# Patient Record
Sex: MALE | Race: WHITE | NOT HISPANIC OR LATINO | Employment: OTHER | URBAN - METROPOLITAN AREA
[De-identification: names, ages, dates, MRNs, and addresses within clinical notes are randomized per-mention and may not be internally consistent; named-entity substitution may affect disease eponyms.]

---

## 2023-02-25 PROBLEM — R09.89 CAROTID BRUIT: Status: ACTIVE | Noted: 2023-02-25

## 2023-02-25 PROBLEM — I25.10 ARTERIOSCLEROSIS OF CORONARY ARTERY: Status: ACTIVE | Noted: 2023-02-25

## 2023-02-25 PROBLEM — E78.5 HYPERLIPIDEMIA: Status: ACTIVE | Noted: 2023-02-25

## 2023-02-25 PROBLEM — G47.33 OSA (OBSTRUCTIVE SLEEP APNEA): Status: ACTIVE | Noted: 2023-02-25

## 2023-02-25 PROBLEM — F41.9 ANXIETY AND DEPRESSION: Status: ACTIVE | Noted: 2023-02-25

## 2023-02-25 PROBLEM — N40.0 BPH (BENIGN PROSTATIC HYPERPLASIA): Status: ACTIVE | Noted: 2023-02-25

## 2023-02-25 PROBLEM — R73.9 BORDERLINE HYPERGLYCEMIA: Status: ACTIVE | Noted: 2023-02-25

## 2023-02-25 PROBLEM — E53.8 VITAMIN B12 DEFICIENCY: Status: ACTIVE | Noted: 2023-02-25

## 2023-02-25 PROBLEM — E78.5 DYSLIPIDEMIA: Status: ACTIVE | Noted: 2023-02-25

## 2023-02-25 PROBLEM — Z98.61 S/P PTCA (PERCUTANEOUS TRANSLUMINAL CORONARY ANGIOPLASTY): Status: ACTIVE | Noted: 2023-02-25

## 2023-02-25 PROBLEM — E78.01 ESSENTIAL FAMILIAL HYPERCHOLESTEROLEMIA: Status: ACTIVE | Noted: 2023-02-25

## 2023-02-25 PROBLEM — F32.A ANXIETY AND DEPRESSION: Status: ACTIVE | Noted: 2023-02-25

## 2023-02-25 PROBLEM — R35.0 INCREASED FREQUENCY OF URINATION: Status: ACTIVE | Noted: 2023-02-25

## 2023-02-25 PROBLEM — J44.89 CHRONIC OBSTRUCTIVE ASTHMA (MULTI): Status: ACTIVE | Noted: 2023-02-25

## 2023-02-25 RX ORDER — NITROGLYCERIN 0.4 MG/1
0.4 TABLET SUBLINGUAL EVERY 5 MIN PRN
COMMUNITY
Start: 2017-05-15

## 2023-02-25 RX ORDER — METOPROLOL SUCCINATE 25 MG/1
1 TABLET, EXTENDED RELEASE ORAL DAILY
COMMUNITY
Start: 2017-05-15 | End: 2023-07-17

## 2023-02-25 RX ORDER — ASPIRIN 81 MG/1
81 TABLET ORAL NIGHTLY
COMMUNITY
Start: 2022-04-06 | End: 2023-11-13 | Stop reason: HOSPADM

## 2023-02-25 RX ORDER — EVOLOCUMAB 140 MG/ML
140 INJECTION, SOLUTION SUBCUTANEOUS
COMMUNITY
Start: 2022-05-16 | End: 2023-11-07 | Stop reason: SDUPTHER

## 2023-02-25 RX ORDER — FLUTICASONE FUROATE AND VILANTEROL 200; 25 UG/1; UG/1
1 POWDER RESPIRATORY (INHALATION) DAILY
COMMUNITY
Start: 2016-11-23 | End: 2023-11-07 | Stop reason: ALTCHOICE

## 2023-02-25 RX ORDER — FESOTERODINE FUMARATE 8 MG/1
1 TABLET, FILM COATED, EXTENDED RELEASE ORAL DAILY
COMMUNITY

## 2023-02-25 RX ORDER — EZETIMIBE 10 MG/1
1 TABLET ORAL DAILY
COMMUNITY
Start: 2022-04-06 | End: 2023-04-04 | Stop reason: SINTOL

## 2023-02-25 RX ORDER — MAGNESIUM 200 MG
1 TABLET ORAL DAILY
COMMUNITY
Start: 2020-09-10 | End: 2024-04-09 | Stop reason: ALTCHOICE

## 2023-04-04 ENCOUNTER — LAB (OUTPATIENT)
Dept: LAB | Facility: LAB | Age: 68
End: 2023-04-04
Payer: MEDICARE

## 2023-04-04 ENCOUNTER — OFFICE VISIT (OUTPATIENT)
Dept: PRIMARY CARE | Facility: CLINIC | Age: 68
End: 2023-04-04
Payer: MEDICARE

## 2023-04-04 VITALS
BODY MASS INDEX: 33.14 KG/M2 | TEMPERATURE: 97.6 F | HEART RATE: 57 BPM | HEIGHT: 66 IN | OXYGEN SATURATION: 93 % | SYSTOLIC BLOOD PRESSURE: 110 MMHG | WEIGHT: 206.2 LBS | DIASTOLIC BLOOD PRESSURE: 70 MMHG

## 2023-04-04 DIAGNOSIS — J44.89 CHRONIC OBSTRUCTIVE ASTHMA (MULTI): ICD-10-CM

## 2023-04-04 DIAGNOSIS — M79.10 MYALGIA: ICD-10-CM

## 2023-04-04 DIAGNOSIS — Z12.11 SCREEN FOR COLON CANCER: ICD-10-CM

## 2023-04-04 DIAGNOSIS — R79.9 ABNORMAL FINDING OF BLOOD CHEMISTRY, UNSPECIFIED: ICD-10-CM

## 2023-04-04 DIAGNOSIS — I10 HYPERTENSION, UNSPECIFIED TYPE: ICD-10-CM

## 2023-04-04 DIAGNOSIS — Z00.00 ROUTINE GENERAL MEDICAL EXAMINATION AT HEALTH CARE FACILITY: Primary | ICD-10-CM

## 2023-04-04 DIAGNOSIS — N40.0 BENIGN PROSTATIC HYPERPLASIA, UNSPECIFIED WHETHER LOWER URINARY TRACT SYMPTOMS PRESENT: ICD-10-CM

## 2023-04-04 DIAGNOSIS — G47.33 OSA (OBSTRUCTIVE SLEEP APNEA): ICD-10-CM

## 2023-04-04 DIAGNOSIS — M89.9 DISORDER OF BONE, UNSPECIFIED: ICD-10-CM

## 2023-04-04 DIAGNOSIS — E53.8 VITAMIN B12 DEFICIENCY: ICD-10-CM

## 2023-04-04 DIAGNOSIS — G62.9 NEUROPATHY: ICD-10-CM

## 2023-04-04 DIAGNOSIS — Z98.61 S/P PTCA (PERCUTANEOUS TRANSLUMINAL CORONARY ANGIOPLASTY): ICD-10-CM

## 2023-04-04 DIAGNOSIS — M19.90 ARTHRITIS: ICD-10-CM

## 2023-04-04 DIAGNOSIS — I25.10 ARTERIOSCLEROSIS OF CORONARY ARTERY: ICD-10-CM

## 2023-04-04 DIAGNOSIS — Z13.31 SCREENING FOR DEPRESSION: ICD-10-CM

## 2023-04-04 LAB
ALANINE AMINOTRANSFERASE (SGPT) (U/L) IN SER/PLAS: 45 U/L (ref 10–52)
ALBUMIN (G/DL) IN SER/PLAS: 4.2 G/DL (ref 3.4–5)
ALKALINE PHOSPHATASE (U/L) IN SER/PLAS: 96 U/L (ref 33–136)
ANION GAP IN SER/PLAS: 11 MMOL/L (ref 10–20)
ASPARTATE AMINOTRANSFERASE (SGOT) (U/L) IN SER/PLAS: 29 U/L (ref 9–39)
BASOPHILS (10*3/UL) IN BLOOD BY AUTOMATED COUNT: 0.04 X10E9/L (ref 0–0.1)
BASOPHILS/100 LEUKOCYTES IN BLOOD BY AUTOMATED COUNT: 0.7 % (ref 0–2)
BILIRUBIN TOTAL (MG/DL) IN SER/PLAS: 0.8 MG/DL (ref 0–1.2)
CALCIUM (MG/DL) IN SER/PLAS: 8.9 MG/DL (ref 8.6–10.3)
CARBON DIOXIDE, TOTAL (MMOL/L) IN SER/PLAS: 27 MMOL/L (ref 21–32)
CHLORIDE (MMOL/L) IN SER/PLAS: 106 MMOL/L (ref 98–107)
CREATINE KINASE (U/L) IN SER/PLAS: 98 U/L (ref 0–325)
CREATININE (MG/DL) IN SER/PLAS: 0.74 MG/DL (ref 0.5–1.3)
EOSINOPHILS (10*3/UL) IN BLOOD BY AUTOMATED COUNT: 0.2 X10E9/L (ref 0–0.7)
EOSINOPHILS/100 LEUKOCYTES IN BLOOD BY AUTOMATED COUNT: 3.3 % (ref 0–6)
ERYTHROCYTE DISTRIBUTION WIDTH (RATIO) BY AUTOMATED COUNT: 12.3 % (ref 11.5–14.5)
ERYTHROCYTE MEAN CORPUSCULAR HEMOGLOBIN CONCENTRATION (G/DL) BY AUTOMATED: 33.8 G/DL (ref 32–36)
ERYTHROCYTE MEAN CORPUSCULAR VOLUME (FL) BY AUTOMATED COUNT: 88 FL (ref 80–100)
ERYTHROCYTES (10*6/UL) IN BLOOD BY AUTOMATED COUNT: 5.14 X10E12/L (ref 4.5–5.9)
GFR MALE: >90 ML/MIN/1.73M2
GLUCOSE (MG/DL) IN SER/PLAS: 89 MG/DL (ref 74–99)
HEMATOCRIT (%) IN BLOOD BY AUTOMATED COUNT: 45.3 % (ref 41–52)
HEMOGLOBIN (G/DL) IN BLOOD: 15.3 G/DL (ref 13.5–17.5)
IMMATURE GRANULOCYTES/100 LEUKOCYTES IN BLOOD BY AUTOMATED COUNT: 0.3 % (ref 0–0.9)
LEUKOCYTES (10*3/UL) IN BLOOD BY AUTOMATED COUNT: 6.1 X10E9/L (ref 4.4–11.3)
LYMPHOCYTES (10*3/UL) IN BLOOD BY AUTOMATED COUNT: 2.15 X10E9/L (ref 1.2–4.8)
LYMPHOCYTES/100 LEUKOCYTES IN BLOOD BY AUTOMATED COUNT: 35.2 % (ref 13–44)
MAGNESIUM (MG/DL) IN SER/PLAS: 2.13 MG/DL (ref 1.6–2.4)
MONOCYTES (10*3/UL) IN BLOOD BY AUTOMATED COUNT: 0.49 X10E9/L (ref 0.1–1)
MONOCYTES/100 LEUKOCYTES IN BLOOD BY AUTOMATED COUNT: 8 % (ref 2–10)
NEUTROPHILS (10*3/UL) IN BLOOD BY AUTOMATED COUNT: 3.2 X10E9/L (ref 1.2–7.7)
NEUTROPHILS/100 LEUKOCYTES IN BLOOD BY AUTOMATED COUNT: 52.5 % (ref 40–80)
PLATELETS (10*3/UL) IN BLOOD AUTOMATED COUNT: 221 X10E9/L (ref 150–450)
POTASSIUM (MMOL/L) IN SER/PLAS: 4.3 MMOL/L (ref 3.5–5.3)
PROTEIN TOTAL: 6.5 G/DL (ref 6.4–8.2)
SODIUM (MMOL/L) IN SER/PLAS: 140 MMOL/L (ref 136–145)
URATE (MG/DL) IN SER/PLAS: 5.6 MG/DL (ref 4–7.5)
UREA NITROGEN (MG/DL) IN SER/PLAS: 14 MG/DL (ref 6–23)

## 2023-04-04 PROCEDURE — G0439 PPPS, SUBSEQ VISIT: HCPCS | Performed by: INTERNAL MEDICINE

## 2023-04-04 PROCEDURE — 86039 ANTINUCLEAR ANTIBODIES (ANA): CPT

## 2023-04-04 PROCEDURE — 3078F DIAST BP <80 MM HG: CPT | Performed by: INTERNAL MEDICINE

## 2023-04-04 PROCEDURE — 36415 COLL VENOUS BLD VENIPUNCTURE: CPT

## 2023-04-04 PROCEDURE — 1170F FXNL STATUS ASSESSED: CPT | Performed by: INTERNAL MEDICINE

## 2023-04-04 PROCEDURE — 1160F RVW MEDS BY RX/DR IN RCRD: CPT | Performed by: INTERNAL MEDICINE

## 2023-04-04 PROCEDURE — 87476 LYME DIS DNA AMP PROBE: CPT

## 2023-04-04 PROCEDURE — 84550 ASSAY OF BLOOD/URIC ACID: CPT

## 2023-04-04 PROCEDURE — 83036 HEMOGLOBIN GLYCOSYLATED A1C: CPT

## 2023-04-04 PROCEDURE — G0444 DEPRESSION SCREEN ANNUAL: HCPCS | Performed by: INTERNAL MEDICINE

## 2023-04-04 PROCEDURE — 1036F TOBACCO NON-USER: CPT | Performed by: INTERNAL MEDICINE

## 2023-04-04 PROCEDURE — 85025 COMPLETE CBC W/AUTO DIFF WBC: CPT

## 2023-04-04 PROCEDURE — 82306 VITAMIN D 25 HYDROXY: CPT

## 2023-04-04 PROCEDURE — 83735 ASSAY OF MAGNESIUM: CPT

## 2023-04-04 PROCEDURE — 80053 COMPREHEN METABOLIC PANEL: CPT

## 2023-04-04 PROCEDURE — 99214 OFFICE O/P EST MOD 30 MIN: CPT | Performed by: INTERNAL MEDICINE

## 2023-04-04 PROCEDURE — 3074F SYST BP LT 130 MM HG: CPT | Performed by: INTERNAL MEDICINE

## 2023-04-04 PROCEDURE — 86038 ANTINUCLEAR ANTIBODIES: CPT

## 2023-04-04 PROCEDURE — 82550 ASSAY OF CK (CPK): CPT

## 2023-04-04 PROCEDURE — 1159F MED LIST DOCD IN RCRD: CPT | Performed by: INTERNAL MEDICINE

## 2023-04-04 PROCEDURE — 86225 DNA ANTIBODY NATIVE: CPT

## 2023-04-04 PROCEDURE — 86235 NUCLEAR ANTIGEN ANTIBODY: CPT

## 2023-04-04 ASSESSMENT — ACTIVITIES OF DAILY LIVING (ADL)
TAKING_MEDICATION: INDEPENDENT
GROCERY_SHOPPING: INDEPENDENT
BATHING: INDEPENDENT
DRESSING: INDEPENDENT
MANAGING_FINANCES: INDEPENDENT
DOING_HOUSEWORK: INDEPENDENT

## 2023-04-04 ASSESSMENT — ENCOUNTER SYMPTOMS
DEPRESSION: 0
BLOOD IN STOOL: 0
LOSS OF SENSATION IN FEET: 0
DIFFICULTY URINATING: 0
HEADACHES: 0
SORE THROAT: 0
DIARRHEA: 0
ARTHRALGIAS: 1
PALPITATIONS: 0
FEVER: 0
WHEEZING: 0
ABDOMINAL PAIN: 0
SINUS PAIN: 0
UNEXPECTED WEIGHT CHANGE: 0
WEAKNESS: 1
DIZZINESS: 0
COUGH: 0
NUMBNESS: 1
OCCASIONAL FEELINGS OF UNSTEADINESS: 0
BRUISES/BLEEDS EASILY: 0
FATIGUE: 0

## 2023-04-04 ASSESSMENT — PATIENT HEALTH QUESTIONNAIRE - PHQ9
1. LITTLE INTEREST OR PLEASURE IN DOING THINGS: NOT AT ALL
2. FEELING DOWN, DEPRESSED OR HOPELESS: NOT AT ALL
SUM OF ALL RESPONSES TO PHQ9 QUESTIONS 1 AND 2: 0

## 2023-04-04 NOTE — PROGRESS NOTES
"Subjective   Reason for Visit: Talon Leone is an 67 y.o. male here for a Medicare Wellness visit.          Reviewed all medications by prescribing practitioner or clinical pharmacist (such as prescriptions, OTCs, herbal therapies and supplements) and documented in the medical record.     - Screening for depression  I spent 15 minutes obtaining and discussing depression screening using PHQ-2 questions with results documented in the chart.  -Bilateral knee pain radiating down to both feet with burning sensation on and off  Patient counseled about neuropathy versus arthritis  Obtain hemoglobin A1c arthritis profile follow-up lab results  - History of statin intolerance on Repatha  Patient started on Zetia need to discontinue at this time see if any improvement  - Benign prostatic hypertrophy symptoms improving now continue with current medication  - Coronary artery disease compensated continue with aspirin daily  - COPD compensated on Breo as needed doing well â€\"Declined flu vaccine   Screening colonoscopy plan to have May 2023 with Dr. Delcid, new referral today  Follow-up with patient in 4 weeks             Patient Care Team:  Julienne Wilhelm MD as PCP - General  Julienne Wilhelm MD [facility] PCP [facility]     Review of Systems   Constitutional:  Negative for fatigue, fever and unexpected weight change.   HENT:  Negative for congestion, ear discharge, ear pain, mouth sores, sinus pain and sore throat.    Eyes:  Negative for visual disturbance.   Respiratory:  Negative for cough and wheezing.    Cardiovascular:  Negative for chest pain, palpitations and leg swelling.   Gastrointestinal:  Negative for abdominal pain, blood in stool and diarrhea.   Genitourinary:  Negative for difficulty urinating.   Musculoskeletal:  Positive for arthralgias and gait problem.   Skin:  Negative for rash.   Neurological:  Positive for weakness and numbness. Negative for dizziness and headaches.   Hematological:  Does " "not bruise/bleed easily.   Psychiatric/Behavioral:  Negative for behavioral problems.    All other systems reviewed and are negative.      Objective   Vitals:  /70   Pulse 57   Temp 36.4 °C (97.6 °F)   Ht 1.676 m (5' 6\")   Wt 93.5 kg (206 lb 3.2 oz)   SpO2 93%   BMI 33.28 kg/m²       Physical Exam  Vitals and nursing note reviewed.   Constitutional:       Appearance: Normal appearance.   HENT:      Head: Normocephalic.      Nose: Nose normal.   Eyes:      Conjunctiva/sclera: Conjunctivae normal.      Pupils: Pupils are equal, round, and reactive to light.   Cardiovascular:      Rate and Rhythm: Regular rhythm.      Heart sounds: No murmur heard.     Comments: Peripheral pulsations palpable peripherally bilaterally in both dorsalis pedis  Pulmonary:      Effort: Pulmonary effort is normal.      Breath sounds: Normal breath sounds.   Abdominal:      General: Abdomen is flat.      Palpations: Abdomen is soft.   Musculoskeletal:      Cervical back: Neck supple.   Skin:     General: Skin is warm.   Neurological:      General: No focal deficit present.      Mental Status: He is oriented to person, place, and time.      Motor: Weakness present.   Psychiatric:         Mood and Affect: Mood normal.         Assessment/Plan   Problem List Items Addressed This Visit          Nervous    LIZ (obstructive sleep apnea)    Neuropathy    Relevant Orders    Hemoglobin A1c    CBC and Auto Differential       Respiratory    Chronic obstructive asthma (CMS/HCC)       Circulatory    Arteriosclerosis of coronary artery       Genitourinary    BPH (benign prostatic hyperplasia)       Endocrine/Metabolic    Vitamin B12 deficiency       Other    S/P PTCA (percutaneous transluminal coronary angioplasty)    Screening for depression     Other Visit Diagnoses       Routine general medical examination at health care facility    -  Primary    Relevant Orders    1 Year Follow Up In Primary Care - Wellness Exam    Hypertension, unspecified " "type        Relevant Orders    Comprehensive Metabolic Panel    Myalgia        Relevant Orders    CK    Vitamin D 25-Hydroxy,Total (for eval of Vitamin D levels)    Magnesium    Abnormal finding of blood chemistry, unspecified        Relevant Orders    Hemoglobin A1c    Disorder of bone, unspecified        Relevant Orders    Vitamin D 25-Hydroxy,Total (for eval of Vitamin D levels)    KEIRY with Reflex to SAMIR    Lyme disease, PCR    Arthritis        Relevant Orders    Uric acid         - Screening for depression  I spent 15 minutes obtaining and discussing depression screening using PHQ-2 questions with results documented in the chart.  -Bilateral knee pain radiating down to both feet with burning sensation on and off  Patient counseled about neuropathy versus arthritis  Obtain hemoglobin A1c arthritis profile follow-up lab results  - History of statin intolerance on Repatha  Patient started on Zetia need to discontinue at this time see if any improvement  - Benign prostatic hypertrophy symptoms improving now continue with current medication  - Coronary artery disease compensated continue with aspirin daily  - COPD compensated on Breo as needed doing well â€\"Declined flu vaccine   Screening colonoscopy plan to have May 2023 with Dr. Delcid, new referral today  Follow-up with patient in 4 weeks            "

## 2023-04-05 LAB
ANA PATTERN: ABNORMAL
ANA TITER: ABNORMAL
ANTI-CENTROMERE: <0.2 AI
ANTI-CHROMATIN: <0.2 AI
ANTI-DNA (DS): <1 IU/ML
ANTI-JO-1 IGG: <0.2 AI
ANTI-NUCLEAR ANTIBODY (ANA): POSITIVE
ANTI-RIBOSOMAL P: <0.2 AI
ANTI-RNP: <0.2 AI
ANTI-SCL-70: <0.2 AI
ANTI-SM/RNP: <0.2 AI
ANTI-SM: <0.2 AI
ANTI-SSA: <0.2 AI
ANTI-SSB: <0.2 AI
CALCIDIOL (25 OH VITAMIN D3) (NG/ML) IN SER/PLAS: 61 NG/ML
ESTIMATED AVERAGE GLUCOSE FOR HBA1C: 97 MG/DL
HEMOGLOBIN A1C/HEMOGLOBIN TOTAL IN BLOOD: 5 %

## 2023-04-10 ENCOUNTER — APPOINTMENT (OUTPATIENT)
Dept: PRIMARY CARE | Facility: CLINIC | Age: 68
End: 2023-04-10
Payer: MEDICARE

## 2023-04-10 LAB — LYME DISEASE (BORRELIA BURGDORFERI), PCR: NOT DETECTED

## 2023-05-04 ENCOUNTER — APPOINTMENT (OUTPATIENT)
Dept: PRIMARY CARE | Facility: CLINIC | Age: 68
End: 2023-05-04
Payer: MEDICARE

## 2023-05-04 ENCOUNTER — OFFICE VISIT (OUTPATIENT)
Dept: PRIMARY CARE | Facility: CLINIC | Age: 68
End: 2023-05-04
Payer: MEDICARE

## 2023-05-04 VITALS
SYSTOLIC BLOOD PRESSURE: 100 MMHG | BODY MASS INDEX: 32.28 KG/M2 | OXYGEN SATURATION: 95 % | HEART RATE: 62 BPM | WEIGHT: 200 LBS | DIASTOLIC BLOOD PRESSURE: 64 MMHG | TEMPERATURE: 97.4 F

## 2023-05-04 DIAGNOSIS — Z98.61 S/P PTCA (PERCUTANEOUS TRANSLUMINAL CORONARY ANGIOPLASTY): ICD-10-CM

## 2023-05-04 DIAGNOSIS — E78.5 DYSLIPIDEMIA: ICD-10-CM

## 2023-05-04 DIAGNOSIS — N40.0 BENIGN PROSTATIC HYPERPLASIA, UNSPECIFIED WHETHER LOWER URINARY TRACT SYMPTOMS PRESENT: ICD-10-CM

## 2023-05-04 DIAGNOSIS — I25.10 ARTERIOSCLEROSIS OF CORONARY ARTERY: Primary | ICD-10-CM

## 2023-05-04 PROCEDURE — 1036F TOBACCO NON-USER: CPT | Performed by: INTERNAL MEDICINE

## 2023-05-04 PROCEDURE — 99214 OFFICE O/P EST MOD 30 MIN: CPT | Performed by: INTERNAL MEDICINE

## 2023-05-04 PROCEDURE — 1159F MED LIST DOCD IN RCRD: CPT | Performed by: INTERNAL MEDICINE

## 2023-05-04 PROCEDURE — 1160F RVW MEDS BY RX/DR IN RCRD: CPT | Performed by: INTERNAL MEDICINE

## 2023-05-04 RX ORDER — EZETIMIBE 10 MG/1
10 TABLET ORAL DAILY
Qty: 90 TABLET | Refills: 3 | Status: SHIPPED | OUTPATIENT
Start: 2023-05-04 | End: 2023-11-07 | Stop reason: ALTCHOICE

## 2023-05-04 RX ORDER — POLYETHYLENE GLYCOL 3350, SODIUM CHLORIDE, SODIUM BICARBONATE, POTASSIUM CHLORIDE 420; 11.2; 5.72; 1.48 G/4L; G/4L; G/4L; G/4L
POWDER, FOR SOLUTION ORAL
COMMUNITY
Start: 2023-04-07 | End: 2023-11-07 | Stop reason: ALTCHOICE

## 2023-05-04 RX ORDER — MELOXICAM 15 MG/1
15 TABLET ORAL DAILY
COMMUNITY
Start: 2023-05-02 | End: 2023-11-07 | Stop reason: ALTCHOICE

## 2023-05-04 ASSESSMENT — ENCOUNTER SYMPTOMS
FATIGUE: 0
UNEXPECTED WEIGHT CHANGE: 0
DIFFICULTY URINATING: 0
BRUISES/BLEEDS EASILY: 0
WHEEZING: 0
SINUS PAIN: 0
PALPITATIONS: 0
HYPERTENSION: 1
FEVER: 0
ABDOMINAL PAIN: 0
BLOOD IN STOOL: 0
ARTHRALGIAS: 0
SORE THROAT: 0
DIZZINESS: 0
HEADACHES: 0
JOINT SWELLING: 1
COUGH: 0
DIARRHEA: 0

## 2023-05-04 NOTE — PROGRESS NOTES
"Subjective   Patient ID: Talon Leone is a 67 y.o. male who presents for Hypertension and Results (BW).    - Patient seen by orthopedic surgery Dr. Perez patient received a steroid injection lasted for 1 day no improvement  - Severe arthritis patient scheduled second opinion may need arthroscopic surgery versus knee replacement  - Arthritis started meloxicam 15 mg from orthopedic surgery count about risk for heart disease underlying coronary artery disease needs it only as needed or every other day alternate with Tylenol follow-up if no improvement  - Hypercholesterolemia patient not controlled now doing better need to restart the Zetia again  - Statin intolerance continue Repatha as recommended lipid profile controlled  - Benign prostatic hypertrophy symptoms improving now continue with current medication  - Coronary artery disease compensated continue with aspirin daily  - COPD compensated on Breo as needed doing well â€\"Declined flu vaccine   Screening colonoscopy plan to have May 2023 with Dr. Delcid, new referral today  Follow-up 6 months      Hypertension  Pertinent negatives include no chest pain, headaches or palpitations.          Review of Systems   Constitutional:  Negative for fatigue, fever and unexpected weight change.   HENT:  Negative for congestion, ear discharge, ear pain, mouth sores, sinus pain and sore throat.    Eyes:  Negative for visual disturbance.   Respiratory:  Negative for cough and wheezing.    Cardiovascular:  Negative for chest pain, palpitations and leg swelling.   Gastrointestinal:  Negative for abdominal pain, blood in stool and diarrhea.   Genitourinary:  Negative for difficulty urinating.   Musculoskeletal:  Positive for joint swelling. Negative for arthralgias.   Skin:  Negative for rash.   Neurological:  Negative for dizziness and headaches.   Hematological:  Does not bruise/bleed easily.   Psychiatric/Behavioral:  Negative for behavioral problems.    All other systems reviewed " and are negative.      Objective   Lab Results   Component Value Date    HGBA1C 5.0 04/04/2023      /64   Pulse 62   Temp 36.3 °C (97.4 °F)   Wt 90.7 kg (200 lb)   SpO2 95%   BMI 32.28 kg/m²     Physical Exam  Vitals and nursing note reviewed.   Constitutional:       Appearance: Normal appearance.   HENT:      Head: Normocephalic.      Nose: Nose normal.   Eyes:      Conjunctiva/sclera: Conjunctivae normal.      Pupils: Pupils are equal, round, and reactive to light.   Cardiovascular:      Rate and Rhythm: Regular rhythm.   Pulmonary:      Effort: Pulmonary effort is normal.      Breath sounds: Normal breath sounds.   Abdominal:      General: Abdomen is flat.      Palpations: Abdomen is soft.   Musculoskeletal:         General: Tenderness (Bilateral knee arthritis worse on the right) present.      Cervical back: Neck supple.   Skin:     General: Skin is warm.   Neurological:      General: No focal deficit present.      Mental Status: He is oriented to person, place, and time.   Psychiatric:         Mood and Affect: Mood normal.         Assessment/Plan   Talon was seen today for hypertension and results.  Diagnoses and all orders for this visit:  Arteriosclerosis of coronary artery (Primary)  -     ezetimibe (Zetia) 10 mg tablet; Take 1 tablet (10 mg) by mouth once daily.  Dyslipidemia  Benign prostatic hyperplasia, unspecified whether lower urinary tract symptoms present  S/P PTCA (percutaneous transluminal coronary angioplasty)  Other orders  -     Follow Up In Primary Care  -     Follow Up In Primary Care; Future   - Patient seen by orthopedic surgery Dr. Perez patient received a steroid injection lasted for 1 day no improvement  - Severe arthritis patient scheduled second opinion may need arthroscopic surgery versus knee replacement  - Arthritis started meloxicam 15 mg from orthopedic surgery count about risk for heart disease underlying coronary artery disease needs it only as needed or every other  "day alternate with Tylenol follow-up if no improvement  - Hypercholesterolemia patient not controlled now doing better need to restart the Zetia again  - Statin intolerance continue Repatha as recommended lipid profile controlled  - Benign prostatic hypertrophy symptoms improving now continue with current medication  - Coronary artery disease compensated continue with aspirin daily  - COPD compensated on Breo as needed doing well â€\"Declined flu vaccine   Screening colonoscopy plan to have May 2023 with Dr. Delcid, new referral today  Follow-up 6 months    "

## 2023-07-17 DIAGNOSIS — Z98.61 S/P PTCA (PERCUTANEOUS TRANSLUMINAL CORONARY ANGIOPLASTY): ICD-10-CM

## 2023-07-17 DIAGNOSIS — E78.01 ESSENTIAL FAMILIAL HYPERCHOLESTEROLEMIA: ICD-10-CM

## 2023-07-17 RX ORDER — METOPROLOL SUCCINATE 25 MG/1
TABLET, EXTENDED RELEASE ORAL
Qty: 90 TABLET | Refills: 0 | Status: SHIPPED | OUTPATIENT
Start: 2023-07-17 | End: 2023-10-09

## 2023-07-18 ENCOUNTER — HOSPITAL ENCOUNTER (OUTPATIENT)
Dept: DATA CONVERSION | Facility: HOSPITAL | Age: 68
End: 2023-07-18
Attending: INTERNAL MEDICINE | Admitting: INTERNAL MEDICINE
Payer: COMMERCIAL

## 2023-07-18 DIAGNOSIS — I25.10 ATHEROSCLEROTIC HEART DISEASE OF NATIVE CORONARY ARTERY WITHOUT ANGINA PECTORIS: ICD-10-CM

## 2023-07-18 DIAGNOSIS — I10 ESSENTIAL (PRIMARY) HYPERTENSION: ICD-10-CM

## 2023-07-18 DIAGNOSIS — Z88.0 ALLERGY STATUS TO PENICILLIN: ICD-10-CM

## 2023-07-18 DIAGNOSIS — Z80.0 FAMILY HISTORY OF MALIGNANT NEOPLASM OF DIGESTIVE ORGANS: ICD-10-CM

## 2023-07-18 DIAGNOSIS — K64.8 OTHER HEMORRHOIDS: ICD-10-CM

## 2023-07-18 DIAGNOSIS — Z87.891 PERSONAL HISTORY OF NICOTINE DEPENDENCE: ICD-10-CM

## 2023-07-18 DIAGNOSIS — E78.5 HYPERLIPIDEMIA, UNSPECIFIED: ICD-10-CM

## 2023-07-18 DIAGNOSIS — Z12.11 ENCOUNTER FOR SCREENING FOR MALIGNANT NEOPLASM OF COLON: ICD-10-CM

## 2023-09-29 VITALS
RESPIRATION RATE: 16 BRPM | SYSTOLIC BLOOD PRESSURE: 120 MMHG | HEART RATE: 59 BPM | DIASTOLIC BLOOD PRESSURE: 77 MMHG | TEMPERATURE: 97.5 F

## 2023-09-30 NOTE — H&P
History of Present Illness:   History Present Illness:  Reason for surgery: Screening for colon polyps and  colon cancer   HPI:    Patient is here for screening colonoscopy for family history of CRC in father - last colonoscopy in 2018.    PMH:  CAD, HTN, HLD    SH:  Former smoker, occasional alcohol use    FH:  + CRC - father    Allergies:        Allergies:  ·  penicillin : Unknown  ·  statins : Unknown    Home Medication Review:   Home Medications Reviewed: yes     Impression/Procedure:   ·  Impression and Planned Procedure: Colonoscopy       ERAS (Enhanced Recovery After Surgery):  ·  ERAS Patient: no     Review of Systems:   Review of Systems:  Constitutional: NEGATIVE: Fever, Chills, Anorexia,  Weight Loss, Malaise     Respiratory: NEGATIVE: Dry Cough, Productive Cough,  Hemoptysis, Wheezing, Shortness of Breath     Cardiac: NEGATIVE: Chest Pain, Dyspnea on Exertion,  Orthopnea, Palpitations, Syncope     Gastrointestinal: NEGATIVE: Nausea, Vomiting, Diarrhea,  Constipation, Abdominal Pain     All Other Systems: All other systems reviewed and  are negative       Vital Signs:  Temperature C: 36.4 degrees C   Temperature F: 97.5 degrees F   Heart Rate: 59 beats per minute   Respiratory Rate: 16 breath per minute   Blood Pressure Systolic: 120 mm/Hg   Blood Pressure Diastolic: 77 mm/Hg     Physical Exam by System:    Constitutional: Well developed, awake/alert/oriented  x3, no distress, alert and cooperative   Head/Neck: Neck supple, no apparent injury, thyroid  without mass or tenderness, No JVD, trachea midline, no bruits   Respiratory/Thorax: Patent airways, CTAB, normal  breath sounds with good chest expansion, thorax symmetric   Cardiovascular: Regular, rate and rhythm, no murmurs,  2+ equal pulses of the extremities, normal S 1and S 2   Gastrointestinal: Nondistended, soft, non-tender,  no rebound tenderness or guarding, no masses palpable, no organomegaly, +BS, no bruits   Extremities: normal  extremities, no cyanosis edema,  contusions or wounds, no clubbing   Neurological: alert and oriented x3, intact senses,  motor, response and reflexes, normal strength   Psychological: Appropriate mood and behavior   Skin: Warm and dry, no lesions, no rashes     Consent:   COVID-19 Consent:  ·  COVID-19 Risk Consent Surgeon has reviewed key risks related to the risk of samantha COVID-19 and if they contract COVID-19 what the risks are.       Electronic Signatures:  Arnaldo Borja)  (Signed 18-Jul-2023 09:41)   Authored: History of Present Illness, Allergies, Home  Medication Review, Impression/Procedure, ERAS, Review of Systems, Physical Exam, Consent, Note Completion      Last Updated: 18-Jul-2023 09:41 by Arnaldo Borja)

## 2023-10-06 DIAGNOSIS — Z98.61 S/P PTCA (PERCUTANEOUS TRANSLUMINAL CORONARY ANGIOPLASTY): ICD-10-CM

## 2023-10-09 RX ORDER — METOPROLOL SUCCINATE 25 MG/1
25 TABLET, EXTENDED RELEASE ORAL DAILY
Qty: 90 TABLET | Refills: 0 | Status: SHIPPED | OUTPATIENT
Start: 2023-10-09 | End: 2024-01-08

## 2023-10-13 ENCOUNTER — PHARMACY VISIT (OUTPATIENT)
Dept: PHARMACY | Facility: CLINIC | Age: 68
End: 2023-10-13
Payer: COMMERCIAL

## 2023-10-13 ENCOUNTER — SPECIALTY PHARMACY (OUTPATIENT)
Dept: PHARMACY | Facility: CLINIC | Age: 68
End: 2023-10-13

## 2023-10-13 PROCEDURE — RXMED WILLOW AMBULATORY MEDICATION CHARGE

## 2023-11-07 ENCOUNTER — PRE-ADMISSION TESTING (OUTPATIENT)
Dept: PREADMISSION TESTING | Facility: HOSPITAL | Age: 68
End: 2023-11-07
Payer: MEDICARE

## 2023-11-07 ENCOUNTER — TELEPHONE (OUTPATIENT)
Dept: CARDIOLOGY | Facility: CLINIC | Age: 68
End: 2023-11-07

## 2023-11-07 VITALS
TEMPERATURE: 96.4 F | WEIGHT: 195.77 LBS | RESPIRATION RATE: 18 BRPM | DIASTOLIC BLOOD PRESSURE: 89 MMHG | OXYGEN SATURATION: 99 % | HEART RATE: 63 BPM | SYSTOLIC BLOOD PRESSURE: 129 MMHG | BODY MASS INDEX: 29.67 KG/M2 | HEIGHT: 68 IN

## 2023-11-07 DIAGNOSIS — Z01.818 PREOP EXAMINATION: Primary | ICD-10-CM

## 2023-11-07 LAB
ANION GAP SERPL CALC-SCNC: 10 MMOL/L (ref 10–20)
APPEARANCE UR: CLEAR
BILIRUB UR STRIP.AUTO-MCNC: NEGATIVE MG/DL
BUN SERPL-MCNC: 13 MG/DL (ref 6–23)
CALCIUM SERPL-MCNC: 8.6 MG/DL (ref 8.6–10.3)
CHLORIDE SERPL-SCNC: 104 MMOL/L (ref 98–107)
CO2 SERPL-SCNC: 28 MMOL/L (ref 21–32)
COLOR UR: YELLOW
CREAT SERPL-MCNC: 0.77 MG/DL (ref 0.5–1.3)
ERYTHROCYTE [DISTWIDTH] IN BLOOD BY AUTOMATED COUNT: 12.1 % (ref 11.5–14.5)
GFR SERPL CREATININE-BSD FRML MDRD: >90 ML/MIN/1.73M*2
GLUCOSE SERPL-MCNC: 96 MG/DL (ref 74–99)
GLUCOSE UR STRIP.AUTO-MCNC: NEGATIVE MG/DL
HCT VFR BLD AUTO: 44.9 % (ref 41–52)
HGB BLD-MCNC: 15 G/DL (ref 13.5–17.5)
HOLD SPECIMEN: NORMAL
KETONES UR STRIP.AUTO-MCNC: NEGATIVE MG/DL
LEUKOCYTE ESTERASE UR QL STRIP.AUTO: NEGATIVE
MCH RBC QN AUTO: 29.7 PG (ref 26–34)
MCHC RBC AUTO-ENTMCNC: 33.4 G/DL (ref 32–36)
MCV RBC AUTO: 89 FL (ref 80–100)
NITRITE UR QL STRIP.AUTO: NEGATIVE
NRBC BLD-RTO: 0 /100 WBCS (ref 0–0)
PH UR STRIP.AUTO: 6 [PH]
PLATELET # BLD AUTO: 223 X10*3/UL (ref 150–450)
POTASSIUM SERPL-SCNC: 4.2 MMOL/L (ref 3.5–5.3)
PROT UR STRIP.AUTO-MCNC: NEGATIVE MG/DL
RBC # BLD AUTO: 5.05 X10*6/UL (ref 4.5–5.9)
RBC # UR STRIP.AUTO: NEGATIVE /UL
SODIUM SERPL-SCNC: 138 MMOL/L (ref 136–145)
SP GR UR STRIP.AUTO: 1.02
UROBILINOGEN UR STRIP.AUTO-MCNC: 2 MG/DL
WBC # BLD AUTO: 6.4 X10*3/UL (ref 4.4–11.3)

## 2023-11-07 PROCEDURE — 36415 COLL VENOUS BLD VENIPUNCTURE: CPT

## 2023-11-07 PROCEDURE — 85027 COMPLETE CBC AUTOMATED: CPT | Performed by: PHYSICIAN ASSISTANT

## 2023-11-07 PROCEDURE — 80048 BASIC METABOLIC PNL TOTAL CA: CPT | Performed by: PHYSICIAN ASSISTANT

## 2023-11-07 PROCEDURE — 87081 CULTURE SCREEN ONLY: CPT | Mod: GEALAB | Performed by: PHYSICIAN ASSISTANT

## 2023-11-07 PROCEDURE — 81003 URINALYSIS AUTO W/O SCOPE: CPT | Performed by: PHYSICIAN ASSISTANT

## 2023-11-07 PROCEDURE — 99214 OFFICE O/P EST MOD 30 MIN: CPT | Performed by: PHYSICIAN ASSISTANT

## 2023-11-07 RX ORDER — CHLORHEXIDINE GLUCONATE ORAL RINSE 1.2 MG/ML
15 SOLUTION DENTAL DAILY
Qty: 473 ML | Refills: 0 | Status: SHIPPED | OUTPATIENT
Start: 2023-11-07 | End: 2023-11-13 | Stop reason: HOSPADM

## 2023-11-07 ASSESSMENT — ENCOUNTER SYMPTOMS
LIMITED RANGE OF MOTION: 1
NEUROLOGICAL NEGATIVE: 1
RESPIRATORY NEGATIVE: 1
ARTHRALGIAS: 1
NECK NEGATIVE: 1
GASTROINTESTINAL NEGATIVE: 1
CARDIOVASCULAR NEGATIVE: 1
LOSS OF MOTION: 1
CONSTITUTIONAL NEGATIVE: 1

## 2023-11-07 ASSESSMENT — DUKE ACTIVITY SCORE INDEX (DASI)
CAN YOU CLIMB A FLIGHT OF STAIRS OR WALK UP A HILL: YES
CAN YOU DO MODERATE WORK AROUND THE HOUSE LIKE VACUUMING, SWEEPING FLOORS OR CARRYING GROCERIES: YES
DASI METS SCORE: 9.9
CAN YOU PARTICIPATE IN MODERATE RECREATIONAL ACTIVITIES LIKE GOLF, BOWLING, DANCING, DOUBLES TENNIS OR THROWING A BASEBALL OR FOOTBALL: YES
CAN YOU PARTICIPATE IN STRENOUS SPORTS LIKE SWIMMING, SINGLES TENNIS, FOOTBALL, BASKETBALL, OR SKIING: YES
CAN YOU TAKE CARE OF YOURSELF (EAT, DRESS, BATHE, OR USE TOILET): YES
CAN YOU WALK INDOORS, SUCH AS AROUND YOUR HOUSE: YES
CAN YOU DO LIGHT WORK AROUND THE HOUSE LIKE DUSTING OR WASHING DISHES: YES
CAN YOU RUN A SHORT DISTANCE: YES
CAN YOU HAVE SEXUAL RELATIONS: YES
TOTAL_SCORE: 58.2
CAN YOU DO YARD WORK LIKE RAKING LEAVES, WEEDING OR PUSHING A MOWER: YES
CAN YOU DO HEAVY WORK AROUND THE HOUSE LIKE SCRUBBING FLOORS OR LIFTING AND MOVING HEAVY FURNITURE: YES
CAN YOU WALK A BLOCK OR TWO ON LEVEL GROUND: YES

## 2023-11-07 ASSESSMENT — CHADS2 SCORE
AGE GREATER THAN OR EQUAL TO 75: NO
DIABETES: NO
PRIOR STROKE OR TIA OR THROMBOEMBOLISM: NO
CHADS2 SCORE: 1
HYPERTENSION: YES
CHF: NO

## 2023-11-07 ASSESSMENT — LIFESTYLE VARIABLES: SMOKING_STATUS: NONSMOKER

## 2023-11-07 NOTE — H&P (VIEW-ONLY)
CPM/PAT Evaluation       Name: Talon Leone (Talon Leone)  /Age: 1955/68 y.o.     In-Person       Chief Complaint: knee pain    69 y/o male scheduled for R TKA on 23 with Dr. Hernandez secondary to R knee pain.  PMHX includes CAD, HTN, HLD, LIZ, BPH, asthma.  Presents to CPM today for perioperative risk stratification.     Knee Pain   There was no injury mechanism. The pain is present in the right knee. The pain is moderate. The pain has been Worsening since onset. Associated symptoms include a loss of motion. He reports no foreign bodies present. The symptoms are aggravated by movement and weight bearing. He has tried NSAIDs (corticosteroid and gel injections) for the symptoms. The treatment provided no relief.       Past Medical History:   Diagnosis Date    Acute bronchitis due to other specified organisms 2014    Viral bronchitis    Arteriosclerosis of coronary artery     BPH (benign prostatic hyperplasia)     CAD (coronary artery disease)     Chondrocostal junction syndrome (tietze) 2014    Costochondritis    Chronic obstructive asthma     Chronic sinusitis, unspecified     Chronic sinusitis    Deviated nasal septum     Acquired deviated nasal septum    Epigastric abdominal tenderness 2013    Abdominal tenderness, epigastric    HLD (hyperlipidemia)     HTN (hypertension)     Nasal congestion     Nasal congestion    Nausea 2013    Nausea    OAB (overactive bladder)     Obstructive sleep apnea (adult) (pediatric)     does not use his CPAP    Other amnesia 2013    Memory loss    Other conditions influencing health status     Acute Otitis Externa Of The Left Ear    Other conditions influencing health status     Acute Mucoid Otitis Media Of The Left Ear    Other conditions influencing health status     Tympanic Membrane Perforation Of The Left Ear    Other conditions influencing health status     Arthritis    Other hypertrophic disorders of the skin 2016    Skin tag     Otitis media, unspecified, right ear 12/11/2017    Acute right otitis media    Pain in unspecified knee 09/10/2015    Knee pain    Pain in unspecified shoulder 10/01/2015    Shoulder pain    Pathological fracture, unspecified femur, initial encounter for fracture (CMS/[facility]) 07/14/2015    Insufficiency fracture of medial femoral condyle    Personal history of other diseases of male genital organs 10/27/2016    History of acute prostatitis    Personal history of other diseases of the digestive system 10/19/2016    History of acute gastritis    Personal history of other diseases of the nervous system and sense organs 12/15/2017    History of acute otitis media    Personal history of other diseases of the nervous system and sense organs 12/11/2012    History of acute otitis externa    Personal history of other diseases of the nervous system and sense organs 12/11/2012    History of impacted cerumen    Personal history of other diseases of the respiratory system 10/21/2016    History of acute bronchitis    Personal history of other diseases of the respiratory system 01/12/2015    History of sinusitis    Personal history of other diseases of the respiratory system 02/15/2017    History of acute bronchitis    Personal history of other specified conditions 11/01/2016    History of shortness of breath    Unspecified injury of right shoulder and upper arm, initial encounter 09/29/2015    Injury of shoulder, right       Past Surgical History:   Procedure Laterality Date    BLADDER SURGERY      [employer] stage 1    CORONARY ANGIOPLASTY WITH STENT PLACEMENT      2017    HERNIA REPAIR  01/25/2013    Hernia Repair-bilateral inguinal    KNEE ARTHROSCOPY W/ DEBRIDEMENT Left     for arthritis    LUMBAR SPINE SURGERY      microdiscectomy    TRANSURETHRAL RESECTION OF PROSTATE         Patient Sexual activity questions deferred to the physician.    Family History   Problem Relation Name Age of Onset    Coronary artery disease Mother       Other (htn) Mother      Lung cancer Father      Bone cancer Father         Allergies   Allergen Reactions    Penicillins Unknown     as child    Statins-Hmg-Coa Reductase Inhibitors Unknown       Prior to Admission medications    Medication Sig Start Date End Date Taking? Authorizing Provider   aspirin 81 mg EC tablet Take 1 tablet (81 mg) by mouth once daily at bedtime. 4/6/22  Yes Historical Provider, MD   cyanocobalamin, vitamin B-12, 1,000 mcg tablet, sublingual Place 1 tablet (1,000 mcg) under the tongue once daily. 9/10/20  Yes Historical Provider, MD   evolocumab (Repatha SureClick) 140 mg/mL injection INJECT 140 MG (1 PEN) UNDER THE SKIN EVERY 2 WEEKS. 1/9/23 1/9/24 Yes EDUARDA Mcgowan   fesoterodine 8 mg tablet extended release 24 hr Take 1 tablet (8 mg) by mouth once daily.   Yes Historical Provider, MD   metoprolol succinate XL (Toprol-XL) 25 mg 24 hr tablet Take 1 tablet (25 mg) by mouth once daily. Must follow up in office for more refills 10/9/23  Yes Julienne Wilhelm MD   ezetimibe (Zetia) 10 mg tablet Take 1 tablet (10 mg) by mouth once daily. 5/4/23 11/7/23 Yes Julienne Wilhelm MD   chlorhexidine (Peridex) 0.12 % solution Use 15 mL in the mouth or throat once daily. Swish and spit one capful the night before surgery and morning  of surgery 11/7/23 2/5/24  Alicia Delgado PA-C   nitroglycerin (Nitrostat) 0.4 mg SL tablet Place 1 tablet (0.4 mg) under the tongue every 5 minutes if needed for chest pain. 5/15/17   Julienne Wilhelm MD   evolocumab (Repatha SureClick) 140 mg/mL injection Inject 1 mL (140 mg) under the skin every 14 (fourteen) days. 5/16/22 11/7/23  EDUARDA Mcgowan   fluticasone furoate-vilanteroL (Breo Elipta) 200-25 mcg/dose inhaler Inhale 1 puff once daily. 11/23/16 11/7/23  Kadi L Starr, APRN-CNP   meloxicam (Mobic) 15 mg tablet Take 1 tablet (15 mg) by mouth once daily. 5/2/23 11/7/23  Historical Provider, MD   polyethylene glycol-electrolytes 420 gram  solution  4/7/23 11/7/23  Historical Provider, MD IRWIN ROS:   Constitutional:   neg    Neuro/Psych:   neg    Eyes:    use of corrective lenses  Ears:   Nose:   Mouth:   neg    Throat:   neg    Neck:   neg    Cardio:   neg    Respiratory:   neg    Endocrine:   GI:   neg    :   neg    Musculoskeletal:    arthralgias   decreased ROM  Hematologic:   neg    Skin:      Physical Exam  Vitals and nursing note reviewed.   Constitutional:       Appearance: Normal appearance.   HENT:      Head: Normocephalic and atraumatic.      Nose: Nose normal.      Mouth/Throat:      Mouth: Mucous membranes are moist.      Pharynx: Oropharynx is clear.   Eyes:      Conjunctiva/sclera: Conjunctivae normal.      Pupils: Pupils are equal, round, and reactive to light.   Cardiovascular:      Rate and Rhythm: Normal rate and regular rhythm.      Pulses: Normal pulses.      Heart sounds: Normal heart sounds.   Pulmonary:      Effort: Pulmonary effort is normal.      Breath sounds: Normal breath sounds.   Abdominal:      General: Abdomen is flat. Bowel sounds are normal.      Palpations: Abdomen is soft.   Musculoskeletal:      Right knee: Decreased range of motion. Tenderness present.      Left knee: Normal.      Right ankle: Normal.      Left ankle: Normal.   Skin:     General: Skin is warm and dry.   Neurological:      General: No focal deficit present.      Mental Status: He is alert.   Psychiatric:         Mood and Affect: Mood normal.         Behavior: Behavior normal.          PAT AIRWAY:   Airway:     Mallampati::  I    Neck ROM::  Full   upper dentures, lower dentures and partials      Visit Vitals  /89   Pulse 63   Temp 35.8 °C (96.4 °F) (Tympanic)   Resp 18       DASI Risk Score      Flowsheet Row Most Recent Value   DASI SCORE 58.2   METS Score (Will be calculated only when all the questions are answered) 9.9          Caprini DVT Assessment      Flowsheet Row Most Recent Value   DVT Score 6   Current Status Major surgery  planned, including arthroscopic and laproscopic (1-2 hours)   History Prior major surgery   Age 60-75 years   BMI 30 or less          Modified Frailty Index    No data to display       CHADS2 Stroke Risk  Current as of a minute ago        N/A 3 - 100%: High Risk   2 - 3%: Medium Risk   0 - 2%: Low Risk     Last Change: N/A          This score determines the patient's risk of having a stroke if the patient has atrial fibrillation.        This score is not applicable to this patient. Components are not calculated.          Revised Cardiac Risk Index      Flowsheet Row Most Recent Value   Revised Cardiac Risk Calculator 0          Apfel Simplified Score      Flowsheet Row Most Recent Value   Apfel Simplified Score Calculator 2          Risk Analysis Index Results This Encounter    No data found in the last 1 encounters.       Stop Bang Score      Flowsheet Row Most Recent Value   Do you snore loudly? 1   Do you often feel tired or fatigued after your sleep? 1   Has anyone ever observed you stop breathing in your sleep? 1   Do you have or are you being treated for high blood pressure? 1   Recent BMI (Calculated) 29.8   Is BMI greater than 35 kg/m2? 0=No   Age older than 50 years old? 1=Yes   Is your neck circumference greater than 17 inches (Male) or 16 inches (Female)? 0   Gender - Male 1=Yes   STOP-BANG Total Score 6            Assessment and Plan:   67 y/o male scheduled for R TKA on 23 with Dr. Hernandez secondary to R knee pain.      PMHX includes:  CAD: follows with Dr. Ramirez and Renata Husain. Last appt 2023. Has never used nitroglycerin, continue aspirin 81mg. Denies chest pain or SOB. RCR is 0.     HTN: continue current meds   HLD: continue current meds  BPH: continue current meds   Asthma: continue current meds     Anesthesia issues: none    H/O DVT: none    Sleep apnea: yes, no machine    H/O transfusions: no    EK/3/23 NSR    Clearances: Renata Husain cardio    Patient verbalized understanding of preop  instructions given in PAT

## 2023-11-07 NOTE — CPM/PAT H&P
CPM/PAT Evaluation       Name: Talon Leone (Talon Leone)  /Age: 1955/68 y.o.     In-Person       Chief Complaint: knee pain    67 y/o male scheduled for R TKA on 23 with Dr. Hernandez secondary to R knee pain.  PMHX includes CAD, HTN, HLD, LIZ, BPH, asthma.  Presents to CPM today for perioperative risk stratification.     Knee Pain   There was no injury mechanism. The pain is present in the right knee. The pain is moderate. The pain has been Worsening since onset. Associated symptoms include a loss of motion. He reports no foreign bodies present. The symptoms are aggravated by movement and weight bearing. He has tried NSAIDs (corticosteroid and gel injections) for the symptoms. The treatment provided no relief.       Past Medical History:   Diagnosis Date    Acute bronchitis due to other specified organisms 2014    Viral bronchitis    Arteriosclerosis of coronary artery     BPH (benign prostatic hyperplasia)     CAD (coronary artery disease)     Chondrocostal junction syndrome (tietze) 2014    Costochondritis    Chronic obstructive asthma     Chronic sinusitis, unspecified     Chronic sinusitis    Deviated nasal septum     Acquired deviated nasal septum    Epigastric abdominal tenderness 2013    Abdominal tenderness, epigastric    HLD (hyperlipidemia)     HTN (hypertension)     Nasal congestion     Nasal congestion    Nausea 2013    Nausea    OAB (overactive bladder)     Obstructive sleep apnea (adult) (pediatric)     does not use his CPAP    Other amnesia 2013    Memory loss    Other conditions influencing health status     Acute Otitis Externa Of The Left Ear    Other conditions influencing health status     Acute Mucoid Otitis Media Of The Left Ear    Other conditions influencing health status     Tympanic Membrane Perforation Of The Left Ear    Other conditions influencing health status     Arthritis    Other hypertrophic disorders of the skin 2016    Skin tag     Otitis media, unspecified, right ear 12/11/2017    Acute right otitis media    Pain in unspecified knee 09/10/2015    Knee pain    Pain in unspecified shoulder 10/01/2015    Shoulder pain    Pathological fracture, unspecified femur, initial encounter for fracture (CMS/[facility]) 07/14/2015    Insufficiency fracture of medial femoral condyle    Personal history of other diseases of male genital organs 10/27/2016    History of acute prostatitis    Personal history of other diseases of the digestive system 10/19/2016    History of acute gastritis    Personal history of other diseases of the nervous system and sense organs 12/15/2017    History of acute otitis media    Personal history of other diseases of the nervous system and sense organs 12/11/2012    History of acute otitis externa    Personal history of other diseases of the nervous system and sense organs 12/11/2012    History of impacted cerumen    Personal history of other diseases of the respiratory system 10/21/2016    History of acute bronchitis    Personal history of other diseases of the respiratory system 01/12/2015    History of sinusitis    Personal history of other diseases of the respiratory system 02/15/2017    History of acute bronchitis    Personal history of other specified conditions 11/01/2016    History of shortness of breath    Unspecified injury of right shoulder and upper arm, initial encounter 09/29/2015    Injury of shoulder, right       Past Surgical History:   Procedure Laterality Date    BLADDER SURGERY      [employer] stage 1    CORONARY ANGIOPLASTY WITH STENT PLACEMENT      2017    HERNIA REPAIR  01/25/2013    Hernia Repair-bilateral inguinal    KNEE ARTHROSCOPY W/ DEBRIDEMENT Left     for arthritis    LUMBAR SPINE SURGERY      microdiscectomy    TRANSURETHRAL RESECTION OF PROSTATE         Patient Sexual activity questions deferred to the physician.    Family History   Problem Relation Name Age of Onset    Coronary artery disease Mother       Other (htn) Mother      Lung cancer Father      Bone cancer Father         Allergies   Allergen Reactions    Penicillins Unknown     as child    Statins-Hmg-Coa Reductase Inhibitors Unknown       Prior to Admission medications    Medication Sig Start Date End Date Taking? Authorizing Provider   aspirin 81 mg EC tablet Take 1 tablet (81 mg) by mouth once daily at bedtime. 4/6/22  Yes Historical Provider, MD   cyanocobalamin, vitamin B-12, 1,000 mcg tablet, sublingual Place 1 tablet (1,000 mcg) under the tongue once daily. 9/10/20  Yes Historical Provider, MD   evolocumab (Repatha SureClick) 140 mg/mL injection INJECT 140 MG (1 PEN) UNDER THE SKIN EVERY 2 WEEKS. 1/9/23 1/9/24 Yes EDUARDA Mcgowan   fesoterodine 8 mg tablet extended release 24 hr Take 1 tablet (8 mg) by mouth once daily.   Yes Historical Provider, MD   metoprolol succinate XL (Toprol-XL) 25 mg 24 hr tablet Take 1 tablet (25 mg) by mouth once daily. Must follow up in office for more refills 10/9/23  Yes Julienne Wilhelm MD   ezetimibe (Zetia) 10 mg tablet Take 1 tablet (10 mg) by mouth once daily. 5/4/23 11/7/23 Yes Julienne Wilhelm MD   chlorhexidine (Peridex) 0.12 % solution Use 15 mL in the mouth or throat once daily. Swish and spit one capful the night before surgery and morning  of surgery 11/7/23 2/5/24  Alicia Delgado PA-C   nitroglycerin (Nitrostat) 0.4 mg SL tablet Place 1 tablet (0.4 mg) under the tongue every 5 minutes if needed for chest pain. 5/15/17   Julienne Wilhelm MD   evolocumab (Repatha SureClick) 140 mg/mL injection Inject 1 mL (140 mg) under the skin every 14 (fourteen) days. 5/16/22 11/7/23  EDUARDA Mcgowan   fluticasone furoate-vilanteroL (Breo Elipta) 200-25 mcg/dose inhaler Inhale 1 puff once daily. 11/23/16 11/7/23  Kadi L Starr, APRN-CNP   meloxicam (Mobic) 15 mg tablet Take 1 tablet (15 mg) by mouth once daily. 5/2/23 11/7/23  Historical Provider, MD   polyethylene glycol-electrolytes 420 gram  solution  4/7/23 11/7/23  Historical Provider, MD IRWIN ROS:   Constitutional:   neg    Neuro/Psych:   neg    Eyes:    use of corrective lenses  Ears:   Nose:   Mouth:   neg    Throat:   neg    Neck:   neg    Cardio:   neg    Respiratory:   neg    Endocrine:   GI:   neg    :   neg    Musculoskeletal:    arthralgias   decreased ROM  Hematologic:   neg    Skin:      Physical Exam  Vitals and nursing note reviewed.   Constitutional:       Appearance: Normal appearance.   HENT:      Head: Normocephalic and atraumatic.      Nose: Nose normal.      Mouth/Throat:      Mouth: Mucous membranes are moist.      Pharynx: Oropharynx is clear.   Eyes:      Conjunctiva/sclera: Conjunctivae normal.      Pupils: Pupils are equal, round, and reactive to light.   Cardiovascular:      Rate and Rhythm: Normal rate and regular rhythm.      Pulses: Normal pulses.      Heart sounds: Normal heart sounds.   Pulmonary:      Effort: Pulmonary effort is normal.      Breath sounds: Normal breath sounds.   Abdominal:      General: Abdomen is flat. Bowel sounds are normal.      Palpations: Abdomen is soft.   Musculoskeletal:      Right knee: Decreased range of motion. Tenderness present.      Left knee: Normal.      Right ankle: Normal.      Left ankle: Normal.   Skin:     General: Skin is warm and dry.   Neurological:      General: No focal deficit present.      Mental Status: He is alert.   Psychiatric:         Mood and Affect: Mood normal.         Behavior: Behavior normal.          PAT AIRWAY:   Airway:     Mallampati::  I    Neck ROM::  Full   upper dentures, lower dentures and partials      Visit Vitals  /89   Pulse 63   Temp 35.8 °C (96.4 °F) (Tympanic)   Resp 18       DASI Risk Score      Flowsheet Row Most Recent Value   DASI SCORE 58.2   METS Score (Will be calculated only when all the questions are answered) 9.9          Caprini DVT Assessment      Flowsheet Row Most Recent Value   DVT Score 6   Current Status Major surgery  planned, including arthroscopic and laproscopic (1-2 hours)   History Prior major surgery   Age 60-75 years   BMI 30 or less          Modified Frailty Index    No data to display       CHADS2 Stroke Risk  Current as of a minute ago        N/A 3 - 100%: High Risk   2 - 3%: Medium Risk   0 - 2%: Low Risk     Last Change: N/A          This score determines the patient's risk of having a stroke if the patient has atrial fibrillation.        This score is not applicable to this patient. Components are not calculated.          Revised Cardiac Risk Index      Flowsheet Row Most Recent Value   Revised Cardiac Risk Calculator 0          Apfel Simplified Score      Flowsheet Row Most Recent Value   Apfel Simplified Score Calculator 2          Risk Analysis Index Results This Encounter    No data found in the last 1 encounters.       Stop Bang Score      Flowsheet Row Most Recent Value   Do you snore loudly? 1   Do you often feel tired or fatigued after your sleep? 1   Has anyone ever observed you stop breathing in your sleep? 1   Do you have or are you being treated for high blood pressure? 1   Recent BMI (Calculated) 29.8   Is BMI greater than 35 kg/m2? 0=No   Age older than 50 years old? 1=Yes   Is your neck circumference greater than 17 inches (Male) or 16 inches (Female)? 0   Gender - Male 1=Yes   STOP-BANG Total Score 6            Assessment and Plan:   67 y/o male scheduled for R TKA on 23 with Dr. Hernandez secondary to R knee pain.      PMHX includes:  CAD: follows with Dr. Ramirez and Renata Husain. Last appt 2023. Has never used nitroglycerin, continue aspirin 81mg. Denies chest pain or SOB. RCR is 0.     HTN: continue current meds   HLD: continue current meds  BPH: continue current meds   Asthma: continue current meds     Anesthesia issues: none    H/O DVT: none    Sleep apnea: yes, no machine    H/O transfusions: no    EK/3/23 NSR    Clearances: Renata Husain cardio    Patient verbalized understanding of preop  instructions given in PAT

## 2023-11-07 NOTE — PREPROCEDURE INSTRUCTIONS
Medication List            Accurate as of November 7, 2023 10:28 AM. Always use your most recent med list.                aspirin 81 mg EC tablet  Medication Adjustments for Surgery: Continue until night before surgery     chlorhexidine 0.12 % solution  Commonly known as: Peridex  Use 15 mL in the mouth or throat once daily. Swish and spit one capful the night before surgery and morning  of surgery     cyanocobalamin (vitamin B-12) 1,000 mcg tablet, sublingual  Medication Adjustments for Surgery: Stop 7 days before surgery     fesoterodine 8 mg tablet extended release 24 hr  Medication Adjustments for Surgery: Take morning of surgery with sip of water, no other fluids     metoprolol succinate XL 25 mg 24 hr tablet  Commonly known as: Toprol-XL  Take 1 tablet (25 mg) by mouth once daily. Must follow up in office for more refills  Medication Adjustments for Surgery: Take morning of surgery with sip of water, no other fluids     nitroglycerin 0.4 mg SL tablet  Commonly known as: Nitrostat  Medication Adjustments for Surgery: Other (Comment)  Notes to patient: Hasn't needed     Repatha SureClick 140 mg/mL injection  Generic drug: evolocumab  INJECT 140 MG (1 PEN) UNDER THE SKIN EVERY 2 WEEKS.  Notes to patient: Continue per schedule                     Instructions:  SURGERY PRE-OPERATIVE INSTRUCTIONS    *You will receive a phone call the day before your procedure  after 2pm, (or the Friday before your surgery if scheduled on a Monday.) Generally the hospital will be calling you with this information after that time.    *You are not to eat after midnight the night before the surgery. You may have 8oz of a clear liquid up until 2 hours prior to arriving to the hospital. The exception is with medications you were instructed to take day of surgery.    *You may take tylenol for pain/discomfort as needed.     *Stop taking all aspirins, ibuprofen (motrin/advil), naproxen (aleve/naprosyn) for one week prior to  surgery.    *Stop taking all vitamins and supplements one week prior to surgery.     *You should not have alcoholic beverages for 24 hours before surgery.     *You should not smoke 24 hours prior to surgery.     *To help prevent surgical infections bathe/shower with dial soap the evening before surgery.    *You can wear deodorant but no lotion, powder, or perfume/cologne. You should remove all make-up and nail polish at home.    *If you wear glasses, please bring a case for the glasses with you.    *You will be asked to remove dentures and contacts.     *Please leave all valuables at home.    *You should wear loose, comfortable clothing that will accommodate bandages and/or casts.    *You should notify your doctor of any change in your condition (fever, cold, rash, etc). Surgery may need to be re-scheduled until a time you are in better health.    *A responsible adult is required to accompany you to and from the hospital if you are receiving anesthesia or a sedative. Patients are not permitted to drive for 24 hours after anesthesia.     *You can use the [employer] parking which is free for our patients.      *If you have any further questions please call [facility] [phone].       CHG BODY WASH INSTRUCTIONS    *Begin using your CHG soap five days prior to your scheduled surgery.  Allow the CHG soap to sit on skin for 3 minutes. Do not wash with regular soap after you have used the CHG soap. Pat yourself dry with a clean, fresh towel.    *Wash your face with normal soap and water. Apply the CHG solution to a clean, wet washcloth. Firmly lather your entire body from the neck down. Do not use on your face.     *Do not apply powders, deodorants, or lotions after using CHG wash.    *Dress in clean, freshly laundered night clothes.    *Be sure to sleep with clean, freshly laundered sheets.     *Be aware CHG wash may cause stains on fabrics. Rinse your washcloth and other linens that come in contact with CHG completely. Use  non-chlorine detergents to launder items used.     *The morning of surgery is the fifth day, repeat the CHG wash and wear fresh laundered clothes.     *If you have any questions about the CHG soap, call [phone].  MOUTHRINSE INSTRUCTIONS    *CHG oral rinse is used to kill a bacteria in the mouth known as Staphylococcus aureus. This reduces the risks of surgical site infections.     *Using dental rinse: use the CHG oral rinse after you brush your teeth the night before and the morning of the surgery. Follow all directions on your prescription label.     *Use 1 capful (15ml), swish and gargle for at least 30 seconds. Do not swallow. Spit rinse out.     *Do not rinse mouth with water, eat or drink after using CHG mouth rinse.     *Possible side effects: CHG rinse will stick to plaque on teeth. Brush and floss just before use. Teeth brushing will help to avoid staining of plaque during use.    *Any questions, please call [phone].

## 2023-11-09 ENCOUNTER — ANESTHESIA EVENT (OUTPATIENT)
Dept: OPERATING ROOM | Facility: HOSPITAL | Age: 68
End: 2023-11-09
Payer: MEDICARE

## 2023-11-09 LAB — STAPHYLOCOCCUS SPEC CULT: NORMAL

## 2023-11-13 ENCOUNTER — HOME HEALTH ADMISSION (OUTPATIENT)
Dept: HOME HEALTH SERVICES | Facility: HOME HEALTH | Age: 68
End: 2023-11-13
Payer: MEDICARE

## 2023-11-13 ENCOUNTER — APPOINTMENT (OUTPATIENT)
Dept: RADIOLOGY | Facility: HOSPITAL | Age: 68
End: 2023-11-13
Payer: MEDICARE

## 2023-11-13 ENCOUNTER — HOSPITAL ENCOUNTER (OUTPATIENT)
Facility: HOSPITAL | Age: 68
Setting detail: OUTPATIENT SURGERY
Discharge: HOME HEALTH | End: 2023-11-13
Attending: ORTHOPAEDIC SURGERY | Admitting: ORTHOPAEDIC SURGERY
Payer: MEDICARE

## 2023-11-13 ENCOUNTER — PHARMACY VISIT (OUTPATIENT)
Dept: PHARMACY | Facility: CLINIC | Age: 68
End: 2023-11-13
Payer: COMMERCIAL

## 2023-11-13 ENCOUNTER — ANESTHESIA (OUTPATIENT)
Dept: OPERATING ROOM | Facility: HOSPITAL | Age: 68
End: 2023-11-13
Payer: MEDICARE

## 2023-11-13 VITALS
TEMPERATURE: 96.8 F | BODY MASS INDEX: 29.07 KG/M2 | RESPIRATION RATE: 16 BRPM | HEIGHT: 68 IN | SYSTOLIC BLOOD PRESSURE: 144 MMHG | HEART RATE: 53 BPM | DIASTOLIC BLOOD PRESSURE: 77 MMHG | OXYGEN SATURATION: 97 % | WEIGHT: 191.8 LBS

## 2023-11-13 DIAGNOSIS — M17.11 PRIMARY OSTEOARTHRITIS OF RIGHT KNEE: Primary | ICD-10-CM

## 2023-11-13 PROCEDURE — 97161 PT EVAL LOW COMPLEX 20 MIN: CPT | Mod: GP

## 2023-11-13 PROCEDURE — A9999 DME SUPPLY OR ACCESSORY, NOS: HCPCS | Performed by: ORTHOPAEDIC SURGERY

## 2023-11-13 PROCEDURE — RXMED WILLOW AMBULATORY MEDICATION CHARGE

## 2023-11-13 PROCEDURE — 99238 HOSP IP/OBS DSCHRG MGMT 30/<: CPT | Performed by: NURSE PRACTITIONER

## 2023-11-13 PROCEDURE — 2780000003 HC OR 278 NO HCPCS: Performed by: ORTHOPAEDIC SURGERY

## 2023-11-13 PROCEDURE — 76942 ECHO GUIDE FOR BIOPSY: CPT | Performed by: ANESTHESIOLOGY

## 2023-11-13 PROCEDURE — 7100000002 HC RECOVERY ROOM TIME - EACH INCREMENTAL 1 MINUTE: Performed by: ORTHOPAEDIC SURGERY

## 2023-11-13 PROCEDURE — 3600000018 HC OR TIME - INITIAL BASE CHARGE - PROCEDURE LEVEL SIX: Performed by: ORTHOPAEDIC SURGERY

## 2023-11-13 PROCEDURE — 2500000004 HC RX 250 GENERAL PHARMACY W/ HCPCS (ALT 636 FOR OP/ED): Performed by: ANESTHESIOLOGY

## 2023-11-13 PROCEDURE — 7100000001 HC RECOVERY ROOM TIME - INITIAL BASE CHARGE: Performed by: ORTHOPAEDIC SURGERY

## 2023-11-13 PROCEDURE — 2500000004 HC RX 250 GENERAL PHARMACY W/ HCPCS (ALT 636 FOR OP/ED): Performed by: NURSE PRACTITIONER

## 2023-11-13 PROCEDURE — 3700000001 HC GENERAL ANESTHESIA TIME - INITIAL BASE CHARGE: Performed by: ORTHOPAEDIC SURGERY

## 2023-11-13 PROCEDURE — 7100000010 HC PHASE TWO TIME - EACH INCREMENTAL 1 MINUTE: Performed by: ORTHOPAEDIC SURGERY

## 2023-11-13 PROCEDURE — A4217 STERILE WATER/SALINE, 500 ML: HCPCS | Performed by: ORTHOPAEDIC SURGERY

## 2023-11-13 PROCEDURE — 2500000001 HC RX 250 WO HCPCS SELF ADMINISTERED DRUGS (ALT 637 FOR MEDICARE OP): Performed by: NURSE PRACTITIONER

## 2023-11-13 PROCEDURE — 7100000009 HC PHASE TWO TIME - INITIAL BASE CHARGE: Performed by: ORTHOPAEDIC SURGERY

## 2023-11-13 PROCEDURE — 2720000007 HC OR 272 NO HCPCS: Performed by: ORTHOPAEDIC SURGERY

## 2023-11-13 PROCEDURE — 3600000017 HC OR TIME - EACH INCREMENTAL 1 MINUTE - PROCEDURE LEVEL SIX: Performed by: ORTHOPAEDIC SURGERY

## 2023-11-13 PROCEDURE — 2500000005 HC RX 250 GENERAL PHARMACY W/O HCPCS: Performed by: NURSE ANESTHETIST, CERTIFIED REGISTERED

## 2023-11-13 PROCEDURE — A27447 PR TOTAL KNEE ARTHROPLASTY: Performed by: NURSE ANESTHETIST, CERTIFIED REGISTERED

## 2023-11-13 PROCEDURE — 2500000004 HC RX 250 GENERAL PHARMACY W/ HCPCS (ALT 636 FOR OP/ED): Performed by: ORTHOPAEDIC SURGERY

## 2023-11-13 PROCEDURE — 73560 X-RAY EXAM OF KNEE 1 OR 2: CPT | Mod: RIGHT SIDE | Performed by: RADIOLOGY

## 2023-11-13 PROCEDURE — 3700000002 HC GENERAL ANESTHESIA TIME - EACH INCREMENTAL 1 MINUTE: Performed by: ORTHOPAEDIC SURGERY

## 2023-11-13 PROCEDURE — 2500000005 HC RX 250 GENERAL PHARMACY W/O HCPCS: Performed by: NURSE PRACTITIONER

## 2023-11-13 PROCEDURE — 97110 THERAPEUTIC EXERCISES: CPT | Mod: GP

## 2023-11-13 PROCEDURE — C1776 JOINT DEVICE (IMPLANTABLE): HCPCS | Performed by: ORTHOPAEDIC SURGERY

## 2023-11-13 PROCEDURE — 2500000004 HC RX 250 GENERAL PHARMACY W/ HCPCS (ALT 636 FOR OP/ED): Performed by: NURSE ANESTHETIST, CERTIFIED REGISTERED

## 2023-11-13 PROCEDURE — 73560 X-RAY EXAM OF KNEE 1 OR 2: CPT | Mod: RT

## 2023-11-13 DEVICE — IMPLANTABLE DEVICE: Type: IMPLANTABLE DEVICE | Site: KNEE | Status: FUNCTIONAL

## 2023-11-13 DEVICE — DOME, PATELLA, MEDIALIZED, 38MM: Type: IMPLANTABLE DEVICE | Site: KNEE | Status: FUNCTIONAL

## 2023-11-13 DEVICE — BONE CEMENT, SMART SET, HIGH VISCOSITY, 40GM: Type: IMPLANTABLE DEVICE | Site: KNEE | Status: FUNCTIONAL

## 2023-11-13 RX ORDER — ASPIRIN 325 MG
325 TABLET ORAL 2 TIMES DAILY
Qty: 60 TABLET | Refills: 0 | Status: SHIPPED | OUTPATIENT
Start: 2023-11-13 | End: 2023-11-13 | Stop reason: HOSPADM

## 2023-11-13 RX ORDER — DIPHENHYDRAMINE HYDROCHLORIDE 50 MG/ML
INJECTION INTRAMUSCULAR; INTRAVENOUS AS NEEDED
Status: DISCONTINUED | OUTPATIENT
Start: 2023-11-13 | End: 2023-11-13

## 2023-11-13 RX ORDER — DEXAMETHASONE SODIUM PHOSPHATE 4 MG/ML
INJECTION, SOLUTION INTRA-ARTICULAR; INTRALESIONAL; INTRAMUSCULAR; INTRAVENOUS; SOFT TISSUE AS NEEDED
Status: DISCONTINUED | OUTPATIENT
Start: 2023-11-13 | End: 2023-11-13

## 2023-11-13 RX ORDER — NORETHINDRONE AND ETHINYL ESTRADIOL 0.5-0.035
KIT ORAL AS NEEDED
Status: DISCONTINUED | OUTPATIENT
Start: 2023-11-13 | End: 2023-11-13

## 2023-11-13 RX ORDER — ACETAMINOPHEN 325 MG/1
650 TABLET ORAL EVERY 4 HOURS PRN
Status: DISCONTINUED | OUTPATIENT
Start: 2023-11-13 | End: 2023-11-13 | Stop reason: HOSPADM

## 2023-11-13 RX ORDER — CEFAZOLIN SODIUM 2 G/100ML
2 INJECTION, SOLUTION INTRAVENOUS ONCE
Status: COMPLETED | OUTPATIENT
Start: 2023-11-13 | End: 2023-11-13

## 2023-11-13 RX ORDER — SODIUM CHLORIDE 0.9 G/100ML
IRRIGANT IRRIGATION AS NEEDED
Status: DISCONTINUED | OUTPATIENT
Start: 2023-11-13 | End: 2023-11-13 | Stop reason: HOSPADM

## 2023-11-13 RX ORDER — SODIUM CHLORIDE, SODIUM LACTATE, POTASSIUM CHLORIDE, CALCIUM CHLORIDE 600; 310; 30; 20 MG/100ML; MG/100ML; MG/100ML; MG/100ML
100 INJECTION, SOLUTION INTRAVENOUS CONTINUOUS
Status: DISCONTINUED | OUTPATIENT
Start: 2023-11-13 | End: 2023-11-13 | Stop reason: HOSPADM

## 2023-11-13 RX ORDER — DOCUSATE SODIUM 100 MG/1
100 CAPSULE, LIQUID FILLED ORAL 2 TIMES DAILY
Qty: 60 CAPSULE | Refills: 0 | Status: SHIPPED | OUTPATIENT
Start: 2023-11-13 | End: 2023-12-13

## 2023-11-13 RX ORDER — ALBUTEROL SULFATE 0.83 MG/ML
2.5 SOLUTION RESPIRATORY (INHALATION) ONCE AS NEEDED
Status: DISCONTINUED | OUTPATIENT
Start: 2023-11-13 | End: 2023-11-13 | Stop reason: HOSPADM

## 2023-11-13 RX ORDER — TRANEXAMIC ACID 100 MG/ML
1000 INJECTION, SOLUTION INTRAVENOUS ONCE
Status: COMPLETED | OUTPATIENT
Start: 2023-11-13 | End: 2023-11-13

## 2023-11-13 RX ORDER — CELECOXIB 400 MG/1
400 CAPSULE ORAL ONCE
Status: COMPLETED | OUTPATIENT
Start: 2023-11-13 | End: 2023-11-13

## 2023-11-13 RX ORDER — ACETAMINOPHEN 325 MG/1
975 TABLET ORAL ONCE
Status: COMPLETED | OUTPATIENT
Start: 2023-11-13 | End: 2023-11-13

## 2023-11-13 RX ORDER — FENTANYL CITRATE 50 UG/ML
INJECTION, SOLUTION INTRAMUSCULAR; INTRAVENOUS AS NEEDED
Status: DISCONTINUED | OUTPATIENT
Start: 2023-11-13 | End: 2023-11-13

## 2023-11-13 RX ORDER — HYDROCODONE BITARTRATE AND ACETAMINOPHEN 5; 325 MG/1; MG/1
1 TABLET ORAL EVERY 4 HOURS PRN
Qty: 36 TABLET | Refills: 0 | Status: SHIPPED | OUTPATIENT
Start: 2023-11-13 | End: 2023-11-13 | Stop reason: HOSPADM

## 2023-11-13 RX ORDER — MIDAZOLAM HYDROCHLORIDE 1 MG/ML
INJECTION, SOLUTION INTRAMUSCULAR; INTRAVENOUS AS NEEDED
Status: DISCONTINUED | OUTPATIENT
Start: 2023-11-13 | End: 2023-11-13

## 2023-11-13 RX ORDER — ONDANSETRON HYDROCHLORIDE 2 MG/ML
8 INJECTION, SOLUTION INTRAVENOUS ONCE
Status: DISCONTINUED | OUTPATIENT
Start: 2023-11-13 | End: 2023-11-13 | Stop reason: HOSPADM

## 2023-11-13 RX ORDER — PROPOFOL 10 MG/ML
INJECTION, EMULSION INTRAVENOUS CONTINUOUS PRN
Status: DISCONTINUED | OUTPATIENT
Start: 2023-11-13 | End: 2023-11-13

## 2023-11-13 RX ORDER — BUPIVACAINE HYDROCHLORIDE 7.5 MG/ML
INJECTION, SOLUTION INTRASPINAL AS NEEDED
Status: DISCONTINUED | OUTPATIENT
Start: 2023-11-13 | End: 2023-11-13

## 2023-11-13 RX ORDER — ONDANSETRON HYDROCHLORIDE 2 MG/ML
INJECTION, SOLUTION INTRAVENOUS AS NEEDED
Status: DISCONTINUED | OUTPATIENT
Start: 2023-11-13 | End: 2023-11-13

## 2023-11-13 RX ORDER — SODIUM CHLORIDE, SODIUM LACTATE, POTASSIUM CHLORIDE, CALCIUM CHLORIDE 600; 310; 30; 20 MG/100ML; MG/100ML; MG/100ML; MG/100ML
75 INJECTION, SOLUTION INTRAVENOUS CONTINUOUS
Status: DISCONTINUED | OUTPATIENT
Start: 2023-11-13 | End: 2023-11-13

## 2023-11-13 RX ADMIN — TRANEXAMIC ACID 1000 MG: 1 INJECTION, SOLUTION INTRAVENOUS at 12:05

## 2023-11-13 RX ADMIN — CELECOXIB 400 MG: 400 CAPSULE ORAL at 09:00

## 2023-11-13 RX ADMIN — CEFAZOLIN SODIUM 2 G: 2 INJECTION, SOLUTION INTRAVENOUS at 10:37

## 2023-11-13 RX ADMIN — MIDAZOLAM 2 MG: 1 INJECTION INTRAMUSCULAR; INTRAVENOUS at 10:35

## 2023-11-13 RX ADMIN — BUPIVACAINE HYDROCHLORIDE IN DEXTROSE 1.8 ML: 7.5 INJECTION, SOLUTION SUBARACHNOID at 10:43

## 2023-11-13 RX ADMIN — ACETAMINOPHEN 975 MG: 325 TABLET ORAL at 09:00

## 2023-11-13 RX ADMIN — EPHEDRINE SULFATE 25 MG: 50 INJECTION, SOLUTION INTRAVENOUS at 12:16

## 2023-11-13 RX ADMIN — EPHEDRINE SULFATE 12.5 MG: 50 INJECTION, SOLUTION INTRAVENOUS at 11:29

## 2023-11-13 RX ADMIN — DIPHENHYDRAMINE HYDROCHLORIDE 25 MG: 50 INJECTION, SOLUTION INTRAMUSCULAR; INTRAVENOUS at 10:52

## 2023-11-13 RX ADMIN — MIDAZOLAM 2 MG: 1 INJECTION INTRAMUSCULAR; INTRAVENOUS at 09:45

## 2023-11-13 RX ADMIN — EPHEDRINE SULFATE 12.5 MG: 50 INJECTION, SOLUTION INTRAVENOUS at 11:52

## 2023-11-13 RX ADMIN — FENTANYL CITRATE 100 MCG: 50 INJECTION, SOLUTION INTRAMUSCULAR; INTRAVENOUS at 10:37

## 2023-11-13 RX ADMIN — SODIUM CHLORIDE, POTASSIUM CHLORIDE, SODIUM LACTATE AND CALCIUM CHLORIDE 100 ML/HR: 600; 310; 30; 20 INJECTION, SOLUTION INTRAVENOUS at 09:00

## 2023-11-13 RX ADMIN — PROPOFOL 75 MCG/KG/MIN: 10 INJECTION, EMULSION INTRAVENOUS at 10:44

## 2023-11-13 RX ADMIN — TRANEXAMIC ACID 1000 MG: 1 INJECTION, SOLUTION INTRAVENOUS at 10:46

## 2023-11-13 RX ADMIN — ONDANSETRON 4 MG: 2 INJECTION, SOLUTION INTRAMUSCULAR; INTRAVENOUS at 11:07

## 2023-11-13 RX ADMIN — DEXAMETHASONE SODIUM PHOSPHATE 4 MG: 4 INJECTION, SOLUTION INTRAMUSCULAR; INTRAVENOUS at 11:06

## 2023-11-13 RX ADMIN — SODIUM CHLORIDE, POTASSIUM CHLORIDE, SODIUM LACTATE AND CALCIUM CHLORIDE: 600; 310; 30; 20 INJECTION, SOLUTION INTRAVENOUS at 11:37

## 2023-11-13 SDOH — HEALTH STABILITY: MENTAL HEALTH: CURRENT SMOKER: 0

## 2023-11-13 ASSESSMENT — COGNITIVE AND FUNCTIONAL STATUS - GENERAL
TURNING FROM BACK TO SIDE WHILE IN FLAT BAD: A LITTLE
MOBILITY SCORE: 18
MOVING FROM LYING ON BACK TO SITTING ON SIDE OF FLAT BED WITH BEDRAILS: A LITTLE
STANDING UP FROM CHAIR USING ARMS: A LITTLE
MOVING TO AND FROM BED TO CHAIR: A LITTLE
WALKING IN HOSPITAL ROOM: A LITTLE
CLIMB 3 TO 5 STEPS WITH RAILING: A LITTLE

## 2023-11-13 ASSESSMENT — COLUMBIA-SUICIDE SEVERITY RATING SCALE - C-SSRS
2. HAVE YOU ACTUALLY HAD ANY THOUGHTS OF KILLING YOURSELF?: NO
6. HAVE YOU EVER DONE ANYTHING, STARTED TO DO ANYTHING, OR PREPARED TO DO ANYTHING TO END YOUR LIFE?: NO
1. IN THE PAST MONTH, HAVE YOU WISHED YOU WERE DEAD OR WISHED YOU COULD GO TO SLEEP AND NOT WAKE UP?: NO

## 2023-11-13 ASSESSMENT — PAIN SCALES - GENERAL
PAINLEVEL_OUTOF10: 5 - MODERATE PAIN
PAINLEVEL_OUTOF10: 0 - NO PAIN

## 2023-11-13 ASSESSMENT — PAIN - FUNCTIONAL ASSESSMENT
PAIN_FUNCTIONAL_ASSESSMENT: 0-10
PAIN_FUNCTIONAL_ASSESSMENT: 0-10

## 2023-11-13 NOTE — ANESTHESIA PREPROCEDURE EVALUATION
Patient: Talon Leone    Procedure Information       Date/Time: 11/13/23 1030    Procedure: TOTAL RIGHT KNEE ARTHROPLASTY (Right: Knee)    Location: [facility] / [facility]    Surgeons: Noah Hernandez, DO            Relevant Problems   Cardiovascular   (+) Arteriosclerosis of coronary artery   (+) Essential familial hypercholesterolemia   (+) Hyperlipidemia      Neuro/Psych   (+) Anxiety and depression      Pulmonary   (+) Chronic obstructive asthma   (+) LIZ (obstructive sleep apnea)       Clinical information reviewed:    Allergies  Meds               NPO Detail:  NPO/Void Status  Date of Last Liquid: 11/12/23  Date of Last Solid: 11/12/23         Physical Exam    Airway  Mallampati: II     Cardiovascular   Rhythm: regular     Dental    Pulmonary   Breath sounds clear to auscultation     Abdominal   Abdomen: soft           Anesthesia Plan    ASA 2     spinal     The patient is not a current smoker.  Patient was not previously instructed to abstain from smoking on day of procedure.  Patient did not smoke on day of procedure.    Anesthetic plan and risks discussed with patient.  Use of blood products discussed with patient who.    Plan discussed with CRNA.

## 2023-11-13 NOTE — ANESTHESIA PROCEDURE NOTES
Peripheral Block    Patient location during procedure: pre-op  Reason for block: at surgeon's request and post-op pain management  Staffing  Performed: attending   Authorized by: Pascual Andujar MD    Performed by: Pascual Andujar MD  Preanesthetic Checklist  Completed: patient identified, IV checked, site marked, risks and benefits discussed, surgical consent, monitors and equipment checked, pre-op evaluation and timeout performed   Timeout performed at:   Peripheral Block  Patient position: laying flat  Prep: ChloraPrep and site prepped and draped  Patient monitoring: heart rate and continuous pulse ox  Block type: adductor canal  Laterality: right  Injection technique: single-shot  Guidance: ultrasound guided  Local infiltration: lidocaine  Needle  Needle type: short-bevel   Needle gauge: 22 G  Needle length: 8 cm  Needle localization: anatomical landmarks, ultrasound guidance and nerve stimulator  Assessment  Injection assessment: negative aspiration for heme, no paresthesia on injection, incremental injection and local visualized surrounding nerve on ultrasound  Paresthesia pain: none  Heart rate change: no  Slow fractionated injection: yes  Additional Notes  30mL 0.375% Marcaine 1:400k epi 5mg decadron

## 2023-11-13 NOTE — DISCHARGE INSTRUCTIONS
Your post-op appointment is Nove 28 2023 at 930am at the [facility], [facility].                            Hayde Hi MBA, BSN, RN-BC Orthopedic  Phone: [phone]    Noah Hernandez D.O.  Phone: [phone]    POSTOPERATIVE INSTRUCTIONS: TOTAL HIP & TOTAL KNEE ARTHROPLASTY    General/highlights: Flex/tighten the muscles in the buttocks, thighs and calves often when in chair or bed.  Utilize the incentive spirometer often especially the next 3 days.  Walk at least 10 steps every hour that you are awake.  Take stairs 1 at a time, good leg leads up, bed leg legs down, use the handrails.  You may be weightbearing as tolerated, in general the walker is used for 2 weeks.    PAIN, SWELLING & BRUISING  Some pain, stiffness and swelling is normal for up to 1 year after surgery.  Pain will start to let up over time depending on your activity level.  It is preferable to rest for 24 hours following your surgery  Pain is often delayed for 24-48 hours after surgery.  Pain may be dull/achy, throbbing, or even sharp/nerve sensations  It is normal for swelling and bruising to worsen before it gets better.  These symptoms usually peak 1 week after surgery  Swelling and bruising may appear throughout the leg, all the way down to your toes  Wear your compression stockings every day during the day for 14 days and remove them at night.  This will help control swelling    WOUND CARE INSTRUCTIONS  Your surgical bandage will be removed 2 weeks after surgery at your post-operative visit.  If your bandage becomes compromised, begins to come off before then, or soaks through with drainage call the office immediately.  You may have sutures under the skin that dissolve on their own over time.    As the sutures absorb occasionally a small suture abscess can develop, this is not uncommon for up to 6 weeks, if this occurs please notify your surgeon immediately.    HYGIENE  You may  shower 48 hours after your surgery, provided the Mepilex silver dressing is in place.  No tub bathing or submerging underwater.  Do not scrub directly over the surgical bandage  Do not use any creams, lotions or ointments on the surgical leg for 4 weeks after surgery, or until you have been cleared to do so by your surgeon    GENERAL INSTRUCTIONS  Do not drink alcoholic beverages (beer and wine included) for 24 hours following your surgery, or while you are taking narcotic pain medications  Delay making important decisions until you are fully recovered  You cannot swim or submerge in water for at least 6 weeks after surgery, or until you are cleared by your surgeon.  You may start kneeling 3 months after knee replacement surgery, once you have been cleared by your surgeon.  This may not ever feel “normal” or comfortable    HOME DIET  Resume your normal diet after surgery. If you are on a specific type of diet for your condition, resume that instead.    Choose foods that help promote good bowel habits and prevent constipation, such as foods high in fiber.          POSTOPERATIVE MEDICATIONS    Pain medications have been ordered to help manage pain throughout recovery.  While you are using narcotic pain medication, you should be using a stool softener or laxative to prevent constipation.  My preference is parallax powder once or twice a day when taking the narcotic pain medicine  It is important to eat a small meal or snack before taking pain medications to avoid nausea or stomach upset.    MEDICATION REFILLS - [phone]    If you need to request a medication refill, please call the office between 8:30am-4:30pm, Monday through Friday.    Any calls received outside of this timeframe will be handled on the next business day.    Medication requests received on Saturday or Sunday will be handled on Monday.    Please allow 3-5 business days for all medication requests to be processed.    RESTARTING HOME MEDICATIONS  You  "may restart your home medications the following day after your surgery UNLESS you have been given alternate instructions.    Follow the instructions given to you on your hospital discharge instructions for more information regarding your home medications.    ASSISTIVE DEVICE & MOVEMENT  Initially, you will use a walker or crutches to walk for the first 1-2 weeks.  Once your therapist feels you are ready, you will wean to one crutch or cane followed by no assistive device.  It is important to keep moving throughout recovery.  Walk at least 10 steps every hour that you are awake.  Stairs are part of your recovery, the \"good \"leg leads on the way up, the \"bad \"leg leads on the way down to, use the handrails and not the walker or crutches.  You should be up and walking around several times per day as well as bending your knee and making sure your knee is going completely straight (for knee replacements).  For anterior hip replacements avoid straight leg raise.    NUMBNESS/CLICKING    Decreased sensation or numbness is common on or around the area of the incision due to sensory nerves that are affected at the time of surgery.  The area of numbness will be lateral to the incision  You might always have numbness but the size of the area of numbness should decrease with time.  It is common for patients to have a “click” in the knee with movement.  This is usually nothing to be concerned about.  There are several reasons why your knee can be making these noises, including the implant components rubbing against each other, or a tendons going over a bony prominence.  Grinding can also occur and is not out of the ordinary.  Grinding can be caused by scar tissue formation  Noises will often settle over time once muscle strength improves.    DIFFICULTY SLEEPING    It is very common for patients to have difficulty sleeping at night which can be caused pain, medication, or feeling of anxiety.  Sleep disturbance typically worsens " 4-6 weeks after surgery.  You are permitted to sleep on your back or side with a  pillow between your legs for comfort            DRIVING & TRAVEL  Your surgeon will address this at your post-op appointment.  You must not be taking narcotic pain medication to be cleared to drive. (Usually at least 2 weeks for RIGHT limb and 3 weeks for LEFT limb before driving is permitted)  LEFT LEG JOINT REPLACMENT:  You may drive once you have regained full control of your leg, typically around 2 week after surgery  RIGHT LEG JOINT REPLACEMENT:  You must speak with your surgeon before you resume driving.  Driving can typically be resumed by 4-6 weeks after surgery.  During long distance travel, you should attempt to change position or stand every hour.  You should complete ankle pumps throughout your travel if you are sitting for long periods of time.  If traveling within the first 2 weeks after surgery, you should wear your compression stockings.    DENTAL & OTHER PROCEDURES    All patients must wait a minimum of 3 months for elective procedures, including routine dental cleanings.  For any dental appointment - cleaning or dental procedures - patients must take a prophylactic antibiotic 1 hour before the appointment.  You will also need to call for an antibiotic prior to any other invasive test, procedure, or surgery.  These prophylactic antibiotics will be needed for the rest of your life, in order to prevent infections.  Please call your surgeons office at [phone] to request the antibiotic.      PHYSICAL THERAPY    Following surgery it is important to progress through recovery with in-home or outpatient physical therapy.  You should continue to complete home exercises provided from the hospital on days that you are not working with a physical therapist.    It is common to have a temporary increase in pain and swelling upon starting outpatient physical therapy and/or changing your exercise routine.  Continue to use ice to  help with symptoms.     FOLLOW-UP APPOINTMENT - [phone]    Your post-surgical appointments will take place approximately 2 weeks, 6 weeks, 3 months and 1 year following surgery.  Joint replacements are monitored thereafter every 2-5 years for life.  If you have any questions or need to make changes to this appointment, please contact the office:    EMERGENCIES & WHEN TO CALL YOUR SURGEON  When to contact our office immediately:  Any falls or injury to the joint replacement  Fever >101.5 for at least 48 hours after surgery or chills.  Excessive bleeding from incision(s). A small amount of drainage is normal and expected.  Signs of infection of incision(s)-excessive drainage that is soaking through your dressing (especially if it is pus-like), redness that is spreading out from the edges of your incision, or increased warmth around the area.  Excruciating pain for which the pain medication, taken as instructed, is not helping.  Severe calf pain.  Go directly to the emergency room or call 911, if you are experiencing chest pain or difficulty breathing.              ICE/COLD THERAPY  Ice is most important during the first 2 weeks after surgery, but should be used for several weeks as needed.  Never place ice, or cold therapy devices directly on the skin.  You should always have a protective layer between your skin and the cold.  You have been prescribed to ice your total joint at a minimum of twice per hour for 20 minutes while awake during the first 6 weeks after surgery if you are using ice packs. This will help with pain control.  If you are using an ice machine, please follow ice machine instructions.  After knee replacement is extremely important to elevate the leg straight on an incline, not flat.    COLD THERAPY MACHINE RECOMMENDATIONS      Cold therapy devices can be used before and after surgery to assist in comfort and help to reduce pain and swelling.  These devices differ from ice or ice packs whereas  the mechanism circulates water through tubing and a pad to provide longer periods of cold therapy to the desired site.  While in the hospital, you can use your cold devices around the clock for optimal comfort.  We recommend using cold therapy after working with therapy or completing exercises on your own.  Once you are discharged home, there is no set schedule in which you must follow while using cold therapy.  Below are a few points to remember when using a cold therapy device:    Read the 's instructions prior to first the use.  Follow instructions for filling the cooler (water first, then ice).  Always make sure there is a layer of protection between the cold pad and your skin (Clothing, Towel, Ace Bandage, etc.)  Allow the device to circulate cold water throughout the pad prior wrapping the pad around your leg (approximately 10 minutes).  Place the pad on your leg in the desired position to meet your pain management needs and use the wraps provided to secure the pad to your body.  The purpose of this device is to use consistently throughout the day.  You do not need to need to use the 20 on, 20 off method when using an ice machine.  During waking hours, remove the cold pad every 1-2 hours to perform a skin check  Detach the pad from the cooler and ambulate at least once every hour  After removing the pad, allow at least 30 minutes before resuming cold therapy  You may wear the cold therapy device during periods of sleep including overnight    If you wake up during the night, you can check the skin at this time.  You do not need to wake up specifically to perform skin checks.  Empty the cooler and pad when device is not in use.  Follow 's instructions for cleaning your cold therapy device.    [facility] can assist with problems related to products purchased through the hospital  [phone] - Maxine   or   [phone] - Karolina    Sample Medication Schedule  [facility]    Medication Refills - [phone] - Monday through Friday 8:30am-4:30pm  Please allow 3-5 days for medication refill requests to be processed.

## 2023-11-13 NOTE — PROGRESS NOTES
Physical Therapy    Physical Therapy Evaluation & Treatment    Patient Name: Talon Leone  MRN: [id number]  Today's Date: 11/13/2023   Time Calculation  Start Time: 1544  Stop Time: 1617  Time Calculation (min): 33 min    Assessment/Plan   PT Assessment  PT Assessment Results: Decreased mobility, Decreased endurance  Rehab Prognosis: Excellent  Evaluation/Treatment Tolerance: Patient tolerated treatment well  Medical Staff Made Aware: Yes  End of Session Communication: Bedside nurse  End of Session Patient Position:  (Pt in wheeelchair waiting for RN to remove his IV port and D/C home with his spouse)     PT Plan  PT Eval Only Reason: Safe to return home  PT Discharge Recommendations: Low intensity level of continued care  Equipment Recommended upon Discharge: Wheeled walker  PT - OK to Discharge: Yes (home with home care)      Subjective     General Visit Information:  General  Reason for Referral:  (69 yo male admitted 2' to R knee DJD, s/p R TKA)  Referred By:  (Dr. AUGUSTA Uribe)  Past Medical History Relevant to Rehab:  (CAD, OA, HTN, lumbar spine sx, LIZ)  Prior to Session Communication: Bedside nurse  Patient Position Received: Bed, 3 rail up, Alarm off, not on at start of session  Preferred Learning Style: verbal, visual, written  General Comment:  (Pt cleared for PT by RN. Pt agreeable to work with PT. Ptis moving welland gave greart effort. Pt doesn't need further PT services here as he is discharging home wit his spouse. Recommend Pt have LOW intensity follow up services)  Home Living:  Home Living  Type of Home: House  Lives With: Spouse  Home Adaptive Equipment: Walker rolling or standard, Cane  Home Layout: Two level (with 1st floor set up)  Home Access: Stairs to enter with rails  Entrance Stairs-Rails: Right (R rail ascending)  Entrance Stairs-Number of Steps:  (5)  Bathroom Shower/Tub: Tub/shower unit  Bathroom Toilet: Standard  Prior Level of Function:  Prior Function Per Pt/Caregiver Report  Level of  [place]: Independent with ADLs and functional transfers, Independent with homemaking with ambulation (with no device)  Precautions:  Precautions  LE Weight Bearing Status: Weight Bearing as Tolerated (R LE)  Post-Surgical Precautions: Right total knee precautions  Vital Signs:       Objective   Pain:  Pain Assessment  Pain Assessment: 0-10  Pain Score: 5 - Moderate pain  Pain Type: Surgical pain  Pain Location:  (thigh)  Pain Orientation: Right  Cognition:  Cognition  Overall Cognitive Status: Within Functional Limits (A+O x 4)    General Assessments:  General Observation  General Observation:  (Pt performed the following supine exercises; B AP, L QS, glut sets, L heel slides, L hip abd, L SAQ and L SLR each x 20 reps)             Activity Tolerance  Endurance: Endurance does not limit participation in activity         Static Sitting Balance  Static Sitting-Balance Support: No upper extremity supported  Static Sitting-Level of Assistance: Close supervision    Static Standing Balance  Static Standing-Balance Support: Bilateral upper extremity supported (wheeled walker)  Static Standing-Level of Assistance: Close supervision  Dynamic Standing Balance  Dynamic Standing-Balance Support: Bilateral upper extremity supported (wheeled walker)  Dynamic Standing-Comments:  (close supervision)  Functional Assessments:  Bed Mobility  Bed Mobility: Yes  Bed Mobility 1  Bed Mobility 1: Supine to sitting, Sitting to supine  Level of Assistance 1: Close supervision    Transfers  Transfer: Yes  Transfer 1  Transfer From 1: Bed to  Transfer to 1: Stand  Technique 1: Sit to stand, Stand to sit  Transfer Device 1:  (wheeled walker)  Transfer Level of Assistance 1: Close supervision    Ambulation/Gait Training  Ambulation/Gait Training Performed: Yes  Ambulation/Gait Training 1  Surface 1: Level tile  Device 1: Rolling walker  Assistance 1: Close supervision  Quality of Gait 1:  (decreased kristian and step  length)  Comments/Distance (ft) 1:  (100 feet)    Stairs  Stairs: Yes  Stairs  Rails 1: Right (ascending)  Curb Step 1: No  Assistance 1: Close supervision  Comment/Number of Steps 1: 3  Extremity/Trunk Assessments:  RUE   RUE : Within Functional Limits  LUE   LUE: Within Functional Limits  RLE   RLE : Within Functional Limits  LLE   LLE : Within Functional Limits  Treatments:       Bed Mobility  Bed Mobility: Yes  Bed Mobility 1  Bed Mobility 1: Supine to sitting, Sitting to supine  Level of Assistance 1: Close supervision    Ambulation/Gait Training  Ambulation/Gait Training Performed: Yes  Ambulation/Gait Training 1  Surface 1: Level tile  Device 1: Rolling walker  Assistance 1: Close supervision  Quality of Gait 1:  (decreased kristian and step length)  Comments/Distance (ft) 1:  (100 feet)  Transfers  Transfer: Yes  Transfer 1  Transfer From 1: Bed to  Transfer to 1: Stand  Technique 1: Sit to stand, Stand to sit  Transfer Device 1:  (wheeled walker)  Transfer Level of Assistance 1: Close supervision    Stairs  Stairs: Yes  Stairs  Rails 1: Right (ascending)  Curb Step 1: No  Assistance 1: Close supervision  Comment/Number of Steps 1: 3  Outcome Measures:  [facility] Basic Mobility  Turning from your back to your side while in a flat bed without using bedrails: A little  Moving from lying on your back to sitting on the side of a flat bed without using bedrails: A little  Moving to and from bed to chair (including a wheelchair): A little  Standing up from a chair using your arms (e.g. wheelchair or bedside chair): A little  To walk in hospital room: A little  Climbing 3-5 steps with railing: A little  Basic Mobility - Total Score: 18        Education Documentation  No documentation found.  Education Comments  No comments found.

## 2023-11-13 NOTE — ANESTHESIA POSTPROCEDURE EVALUATION
Patient: Talon Leone    Procedure Summary       Date: 11/13/23 Room / Location: [facility] OR 02 / [facility]    Anesthesia Start: 1028 Anesthesia Stop: 1250    Procedure: TOTAL RIGHT KNEE ARTHROPLASTY (Right: Knee) Diagnosis:       Primary osteoarthritis of right knee      (Primary osteoarthritis of right knee [M17.11])    Surgeons: Noah Hernandez DO Responsible Provider: NATHALY High    Anesthesia Type: spinal ASA Status: 2            Anesthesia Type: spinal    Vitals Value Taken Time   /87 11/13/23 1503   Temp  11/13/23 1515   Pulse 76 11/13/23 1509   Resp 16 11/13/23 1320   SpO2 97 % 11/13/23 1510   Vitals shown include unvalidated device data.    Anesthesia Post Evaluation    Patient location during evaluation: PACU  Patient participation: complete - patient participated  Level of consciousness: awake  Pain management: adequate  Multimodal analgesia pain management approach  Airway patency: patent  Two or more strategies used to mitigate risk of obstructive sleep apnea  Cardiovascular status: acceptable  Respiratory status: acceptable  Hydration status: acceptable  Comments: No PONV        No notable events documented.

## 2023-11-13 NOTE — OP NOTE
RIGHT TOTAL KNEE ARTHROPLASTY     Date: 2023   OR Location: [facility] OR    Name: Talon Leone  : 1955    MRN: [id number]    Diagnosis  Pre-op Diagnosis     * Primary osteoarthritis of right knee [M17.11]  Post-op Diagnosis     * Primary osteoarthritis of right knee [M17.11]      Procedures  TOTAL RIGHT KNEE ARTHROPLASTY  10322 - NM ARTHRP KNE CONDYLE&PLATU MEDIAL&LAT COMPARTMENTS      Surgeons      * Noah Hernandez - Primary     Specimen: none.    Staff: Circulator: Sydni Quintero RN  Relief Circulator: Kimberly Dow RN  Scrub Person: Angelina Cano     Drains and/or Catheters: none    Estimated Blood Loss: 50 cc    Resident/Fellow/Other Assistant:  Surgeon(s) and Role:     Procedure Summary  Anesthesia: Consult    Anesthesia Staff: CRNA: KEE High-CRNA   ASA: ASA status not filed in the log.       Intra-op Medications:   - 1 Gram Tranexamic acid prior to surgical incision  - 1 Gram Tranexamic acid at start of incision close  - MELINDA Pain cockail: ropivacaine-epinephrine-clonidine-ketorolac 2.46-0.005- 0.0008-0.3mg/mL periarticular syringe: 50 cc    IMPLANTS:   Implant Name Type Inv. Item Serial No.  Lot No. LRB No. Used Action   BONE CEMENT, SMART SET, HIGH VISCOSITY, 40GM - [id number] Joint Knee BONE CEMENT, SMART SET, HIGH VISCOSITY, 40GM  DEPUY [id number] Right 1 Implanted   FEMORAL, ATTUNE PS, HALIMA, SZ 6, RT - [id number] Joint Knee FEMORAL, ATTUNE PS, HALIMA, SZ 6, RT  DEPUY [id number] Right 1 Implanted   DOME, PATELLA, MEDIALIZED, 38MM - [id number] Joint Knee DOME, PATELLA, MEDIALIZED, 38MM  DEPUY [id number] Right 1 Implanted   INSERT, ATTUNE PS RP, SZ 6, 5MM - [id number] Joint Knee INSERT, ATTUNE PS RP, SZ 6, 5MM  DEPUY [id number] Right 1 Implanted   TIBAL BASE, ATTUNE, ROTATING PLATFORM, HALIMA, SZ 7 - [id number] Joint Knee TIBAL BASE, ATTUNE, ROTATING PLATFORM, HALIMA, SZ 7  DEPUY [id number] Right 1 Implanted       Findings: See operative note    Operative Indications:  Talon Leone  is a patient that is well-known to me and initially presented as an outpatient. They have been treated for advanced knee osteoarthritis with therapy, anti-inflammatories, and activity modification, all without improvement of symptoms. They are here for surgery for Pre-op Diagnosis     * Primary osteoarthritis of right knee [M17.11].     We therefore reviewed the alternative option of elective total knee arthroplasty. The risks and benefits of this procedure were discussed in detail as an outpatient and are documented in our outpatient record. The patient expressed full understanding and wished to proceed. Informed consent was obtained and was placed on the medical chart    The risks, benefits and potential complications of the arthroplasty surgery were discussed with the patient in detail.  Risks including but not limited to the risk of anesthesia, DVT, pulmonary embolism with the possibility of death, retained infection, and neurovascular injury.  Specific details of the procedure, hospitalization, recovery, rehabilitation, and long-term precautions were also provided. Pre-operative teaching was provided. Implant/prosthesis selection was outlined, and the many options available were explained; the final choice will be made at the time of the procedure to match the anatomy and condition of the bone, ligaments, tendons, and muscles. Understanding of all topics was conveyed to me by the patient, and consent was given to proceed with a total knee arthroplasty.     On the day of surgery the site of surgery was properly noted/marked if necessary per policy. The patient has been actively warmed in preoperative area. Preoperative antibiotics were confirmed and started prior to surgical incision. Venous thrombosis prophylaxis have been ordered including bilateral sequential compression devices to start in pre-operative area.     Procedure:     Patient was brought to the operative suite where satisfactory spinal anesthetic  was administered by department anesthesia and an adductor canal block.  Patient was placed in supine position on the operative table.  The left lower extremity was sterilely draped and prepped in routine surgical fashion.  A tourniquet was applied in the upper one third of the patient's right thigh.  The right lower was exsanguinated free of blood tourniquet was inflated to 300 mmHg.  Landmarks were identified.  An incision was carried out over the anterior medial aspect of the patient's left knee extending approximately 12 cm in length in curvilinear fashion.  A try vector quad sparing incision was created.  The patella was everted 90 degrees fat pad was excised.  The patella was measured and found to be 26 mm thick.  The arthritic articular cartilage was resected from the patella using a sagittal saw.  A size 38 trial patella was placed.  The height was measured found to be 26 mm thick.  A protective plate was then placed on the cut surface of the patella and the knee was flexed.  Osteophytes removed with rongeur ACL and PCL were sacrificed along the medial lateral meniscus.    A  hole was created into the distal femur for intramedullary instrumentation.  The distal femoral cut was performed.  The femur was sized and found be a size 6  A size 6 cutting block was pinned to the distal femur and appropriate cuts were carried out using a sagittal saw.  The notch cut was performed using a reciprocating saw.  Attention was then directed to the tibia where a  hole was created for intramedullary instrumentation.  Cutting block assembly was applied over the guide christina.  Proximal tibia cut was performed using a sagittal saw the tibia was sized and found to be a size 7.  Trial implants were inserted using a size 6 femur size 7 tibia 5 mm spacer and a 38 patella.  There was found to be excellent range of motion and stability.  Satisfactory tracking of the patellofemoral joint was appreciated.    Trial components were  removed the proximal tibia was finished using a drill and broach.  Bony ends were lavaged and dried bone cement was mixed.  A size 7 tibial baseplate was cemented into position a size 6 posterior stabilized femoral component was cemented into position.  A size 38 patella was cemented into position.  Excess bone cement was removed.  A size 5 rotating platform polyethylene was placed on the tibial baseplate and the knee was reduced.  The knee was taken through full range of motion found of excellent motion and stability.    The pericapsular tissues and subcutaneous tissues were injected with ropivacaine, saline, Toradol, and epinephrine for postoperative pain control.    The subcutaneous tissues were injected with ropivacaine, Toradol, and saline.  Tourniquet was released good blood flow was noted to return to the patient's right lower extremity.  Layer closure was carried out using a running lock #1 undyed Vicryl for the deep fascia.  Interrupted #2 FiberWire sutures in a barbed #2 suture was also used.  A 2-0 undyed Vicryl for subcutaneous tissues.  A subcuticular closure with 3-0 V-Loc followed by Dermabond Steri-Strips and a Mepilex silver dressing was applied.  RAMAKRISHNA hose and Ace bandage was applied.  Patient was transferred to the PACU having tolerated the procedure well.                                  Weight Bearing Status:  WBAT Bilateral LE                        Range of Motion: As tolerated                               Dressing: Maintain for one week                        Discharge/Follow-up: Follow up in clinic in two weeks    Noah Hernandez D.O.      This note was dictated using speech recognition software and was not corrected for spelling or grammatical errors

## 2023-11-13 NOTE — ANESTHESIA PROCEDURE NOTES
Spinal Block    Patient location during procedure: OR  Start time: 11/13/2023 10:38 AM  End time: 11/13/2023 10:43 AM  Reason for block: primary anesthetic  Staffing  Performed: CRNA   Authorized by: NATHALY High    Performed by: NATHALY High    Preanesthetic Checklist  Completed: patient identified, IV checked, site marked, risks and benefits discussed, surgical consent, monitors and equipment checked, pre-op evaluation, timeout performed and sterile techniques followed  Block Timeout  RN/Licensed healthcare professional reads aloud to the Anesthesia provider and entire team: Patient identity, procedure with side and site, patient position, and as applicable the availability of implants/special equipment/special requirements.    Timeout performed at: 11/13/2023 10:35 AM  Spinal Block  Patient position: sitting  Prep: Betadine  Sterility prep: cap, drape, gloves, hand hygiene and mask  Sedation level: moderate sedation  Patient monitoring: continuous pulse oximetry  Approach: midline  Vertebral space: L3-4  Injection technique: single-shot  Needle  Needle type: pencil-point   Needle gauge: 24 G  Needle length: 4 in  Free flowing CSF: yes    Assessment  Procedure assessment: patient sedated but conversant throughout procedure  Additional Notes  Csf clear and free flow exp dates  checked

## 2023-11-13 NOTE — DISCHARGE SUMMARY
Discharge Diagnosis  Osteoarthritis of right knee    Issues Requiring Follow-Up  Post operative follow up, surgical wound check    Test Results Pending At Discharge  Pending Labs       No current pending labs.            Hospital Course  68 year old male who underwent a right total knee arthroplasty.  No intraoperative complications and he was discharged to PACU in stable condition.  Tolerating PO intake, pain controlled, planning for DC home today.    Pertinent Physical Exam At Time of Discharge  Physical Exam  HENT:      Head: Normocephalic and atraumatic.   Eyes:      Extraocular Movements: Extraocular movements intact.      Conjunctiva/sclera: Conjunctivae normal.      Pupils: Pupils are equal, round, and reactive to light.   Cardiovascular:      Rate and Rhythm: Normal rate and regular rhythm.      Pulses: Normal pulses.   Pulmonary:      Breath sounds: Normal breath sounds.   Abdominal:      General: Bowel sounds are normal.      Palpations: Abdomen is soft.   Musculoskeletal:      Comments: Right knee dressing C/D/I, leg and foot warm and well perfused, motion and sensations intact    Skin:     General: Skin is warm and dry.      Capillary Refill: Capillary refill takes less than 2 seconds.   Neurological:      General: No focal deficit present.      Mental Status: He is alert and oriented to person, place, and time.         Home Medications     Medication List      START taking these medications     aspirin 325 mg tablet; take 1 tablet by oral route  every day   docusate sodium 100 mg capsule; Commonly known as: Colace; Take 1   capsule (100 mg) by mouth 2 times a day.   HYDROcodone-acetaminophen 5-325 mg tablet; Commonly known as: Norco;   take 2 tablet by oral route  every 8 hours as needed for pain     CONTINUE taking these medications     cyanocobalamin (vitamin B-12) 1,000 mcg tablet, sublingual   fesoterodine 8 mg tablet extended release 24 hr   metoprolol succinate XL 25 mg 24 hr tablet; Commonly  known as:   Toprol-XL; Take 1 tablet (25 mg) by mouth once daily. Must follow up in   office for more refills   nitroglycerin 0.4 mg SL tablet; Commonly known as: Nitrostat   Repatha SureClick 140 mg/mL injection; Generic drug: evolocumab; INJECT   140 MG (1 PEN) UNDER THE SKIN EVERY 2 WEEKS.     STOP taking these medications     chlorhexidine 0.12 % solution; Commonly known as: Peridex       Outpatient Follow-Up  Future Appointments   Date Time Provider Department Center   11/14/2023 To Be Determined Jeffery Bertrand, PT [facility]   6/18/2024  9:40 AM Renata Husain APRN-CNP [id number]   6/20/2024  8:20 AM Talon Suh MD [id number] [facility]       Bridgette Weiss, APRN-CNP

## 2023-11-14 ENCOUNTER — HOME CARE VISIT (OUTPATIENT)
Dept: HOME HEALTH SERVICES | Facility: HOME HEALTH | Age: 68
End: 2023-11-14
Payer: MEDICARE

## 2023-11-14 PROCEDURE — 1090000001 HH PPS REVENUE CREDIT

## 2023-11-14 PROCEDURE — 0023 HH SOC

## 2023-11-14 PROCEDURE — 169592 NO-PAY CLAIM PROCEDURE

## 2023-11-14 PROCEDURE — 1090000002 HH PPS REVENUE DEBIT

## 2023-11-14 PROCEDURE — G0151 HHCP-SERV OF PT,EA 15 MIN: HCPCS | Mod: HHH

## 2023-11-14 ASSESSMENT — ENCOUNTER SYMPTOMS
PAIN LOCATION - PAIN DURATION: CONSTANT
SUBJECTIVE PAIN PROGRESSION: GRADUALLY IMPROVING
PAIN LOCATION - PAIN QUALITY: ACHING
PERSON REPORTING PAIN: PATIENT
HIGHEST PAIN SEVERITY IN PAST 24 HOURS: 10/10
LIMITED RANGE OF MOTION: 1
PAIN SEVERITY GOAL: 0/10
PAIN LOCATION - EXACERBATING FACTORS: UPRIGHT ACTIVITY
PAIN LOCATION - PAIN SEVERITY: 6/10
PAIN LOCATION - PAIN FREQUENCY: CONSTANT
PAIN LOCATION: RIGHT KNEE
PAIN LOCATION - RELIEVING FACTORS: REST, ICE
PAIN: 1
LOWEST PAIN SEVERITY IN PAST 24 HOURS: 4/10

## 2023-11-14 ASSESSMENT — ACTIVITIES OF DAILY LIVING (ADL)
TOILETING: MINIMUM ASSIST
CURRENT_FUNCTION: ONE PERSON
AMBULATION ASSISTANCE: 1
TOILETING: ONE PERSON
AMBULATION ASSISTANCE: MINIMUM ASSIST
CURRENT_FUNCTION: MINIMUM ASSIST
TOILETING: 1
PHYSICAL TRANSFERS ASSESSED: 1
AMBULATION ASSISTANCE: ONE PERSON

## 2023-11-14 NOTE — HOME HEALTH
R TKA.  Mary 11 13 23.  RTC 11 28 23.  No outpatient P.T. scheduled just yet.    TUG 47 seconds.  AROM 5 to 98 degrees.    Full instruction in written exercises  1.  Ankle pumps  2.  Quad sets  3.  GLuteal sets  4.  Heel slides  5.  Hip abduction  6.  Straight Leg Raises  7.  Short arc quads        for up to 20 reps, 3 sessions per day.

## 2023-11-15 PROCEDURE — 1090000001 HH PPS REVENUE CREDIT

## 2023-11-15 PROCEDURE — 1090000002 HH PPS REVENUE DEBIT

## 2023-11-16 ENCOUNTER — HOME CARE VISIT (OUTPATIENT)
Dept: HOME HEALTH SERVICES | Facility: HOME HEALTH | Age: 68
End: 2023-11-16
Payer: MEDICARE

## 2023-11-16 VITALS
TEMPERATURE: 98.8 F | DIASTOLIC BLOOD PRESSURE: 70 MMHG | SYSTOLIC BLOOD PRESSURE: 108 MMHG | HEART RATE: 91 BPM | OXYGEN SATURATION: 94 %

## 2023-11-16 PROCEDURE — 1090000002 HH PPS REVENUE DEBIT

## 2023-11-16 PROCEDURE — G0157 HHC PT ASSISTANT EA 15: HCPCS | Mod: HHH

## 2023-11-16 PROCEDURE — 1090000001 HH PPS REVENUE CREDIT

## 2023-11-16 SDOH — HEALTH STABILITY: PHYSICAL HEALTH: PHYSICAL EXERCISE: SEATED

## 2023-11-16 SDOH — HEALTH STABILITY: PHYSICAL HEALTH: EXERCISE ACTIVITY: HEEL SLIDE

## 2023-11-16 SDOH — HEALTH STABILITY: PHYSICAL HEALTH: PHYSICAL EXERCISE: 20

## 2023-11-16 SDOH — HEALTH STABILITY: PHYSICAL HEALTH: EXERCISE ACTIVITY: HIP FLEXION

## 2023-11-16 SDOH — HEALTH STABILITY: PHYSICAL HEALTH: EXERCISE ACTIVITY: GLUTEAL SET

## 2023-11-16 SDOH — HEALTH STABILITY: PHYSICAL HEALTH: PHYSICAL EXERCISE: SUPINE

## 2023-11-16 SDOH — HEALTH STABILITY: PHYSICAL HEALTH: PHYSICAL EXERCISE: 10

## 2023-11-16 SDOH — HEALTH STABILITY: PHYSICAL HEALTH: EXERCISE ACTIVITY: ANKLE PUMPS

## 2023-11-16 SDOH — HEALTH STABILITY: PHYSICAL HEALTH: EXERCISE ACTIVITY: SHORT ARC QUAD

## 2023-11-16 SDOH — HEALTH STABILITY: PHYSICAL HEALTH: EXERCISE ACTIVITY: STRAIGHT LEG RAISE

## 2023-11-16 SDOH — HEALTH STABILITY: PHYSICAL HEALTH: EXERCISE ACTIVITY: HIP ABDUCTION

## 2023-11-16 SDOH — HEALTH STABILITY: PHYSICAL HEALTH: EXERCISE TYPE: SUPINE, SEATED

## 2023-11-16 SDOH — HEALTH STABILITY: PHYSICAL HEALTH: EXERCISE ACTIVITY: QUAD SET

## 2023-11-16 SDOH — HEALTH STABILITY: PHYSICAL HEALTH: EXERCISE ACTIVITY: LONG ARC QUAD

## 2023-11-16 ASSESSMENT — ENCOUNTER SYMPTOMS
PAIN: 1
LOWEST PAIN SEVERITY IN PAST 24 HOURS: 4/10
PAIN LOCATION: RIGHT KNEE
PAIN LOCATION - PAIN SEVERITY: 7/10
HIGHEST PAIN SEVERITY IN PAST 24 HOURS: 10/10
PAIN SEVERITY GOAL: 0/10

## 2023-11-16 ASSESSMENT — ACTIVITIES OF DAILY LIVING (ADL)
AMBULATION_DISTANCE/DURATION_TOLERATED: 75FT
AMBULATION ASSISTANCE ON FLAT SURFACES: 1

## 2023-11-17 PROCEDURE — 1090000001 HH PPS REVENUE CREDIT

## 2023-11-17 PROCEDURE — 1090000002 HH PPS REVENUE DEBIT

## 2023-11-18 PROCEDURE — 1090000001 HH PPS REVENUE CREDIT

## 2023-11-18 PROCEDURE — 1090000002 HH PPS REVENUE DEBIT

## 2023-11-19 PROCEDURE — 1090000002 HH PPS REVENUE DEBIT

## 2023-11-19 PROCEDURE — 1090000001 HH PPS REVENUE CREDIT

## 2023-11-20 PROCEDURE — 1090000002 HH PPS REVENUE DEBIT

## 2023-11-20 PROCEDURE — 1090000001 HH PPS REVENUE CREDIT

## 2023-11-21 ENCOUNTER — HOME CARE VISIT (OUTPATIENT)
Dept: HOME HEALTH SERVICES | Facility: HOME HEALTH | Age: 68
End: 2023-11-21
Payer: MEDICARE

## 2023-11-21 VITALS
DIASTOLIC BLOOD PRESSURE: 70 MMHG | HEART RATE: 94 BPM | SYSTOLIC BLOOD PRESSURE: 124 MMHG | TEMPERATURE: 98.6 F | OXYGEN SATURATION: 97 %

## 2023-11-21 PROCEDURE — 1090000001 HH PPS REVENUE CREDIT

## 2023-11-21 PROCEDURE — 1090000002 HH PPS REVENUE DEBIT

## 2023-11-21 PROCEDURE — G0157 HHC PT ASSISTANT EA 15: HCPCS | Mod: HHH

## 2023-11-21 SDOH — HEALTH STABILITY: PHYSICAL HEALTH: EXERCISE ACTIVITY: KNEE FLEXION

## 2023-11-21 SDOH — HEALTH STABILITY: PHYSICAL HEALTH: PHYSICAL EXERCISE: SEATED

## 2023-11-21 SDOH — HEALTH STABILITY: PHYSICAL HEALTH: EXERCISE ACTIVITY: GLUTEAL SET

## 2023-11-21 SDOH — HEALTH STABILITY: PHYSICAL HEALTH: PHYSICAL EXERCISE: 20

## 2023-11-21 SDOH — HEALTH STABILITY: PHYSICAL HEALTH: EXERCISE TYPE: SUPINE

## 2023-11-21 SDOH — HEALTH STABILITY: PHYSICAL HEALTH: EXERCISE ACTIVITY: LONG ARC QUAD

## 2023-11-21 SDOH — HEALTH STABILITY: PHYSICAL HEALTH: PHYSICAL EXERCISE: SUPINE

## 2023-11-21 SDOH — HEALTH STABILITY: PHYSICAL HEALTH: PHYSICAL EXERCISE: STANDING

## 2023-11-21 SDOH — HEALTH STABILITY: PHYSICAL HEALTH: PHYSICAL EXERCISE: 10

## 2023-11-21 SDOH — HEALTH STABILITY: PHYSICAL HEALTH: EXERCISE ACTIVITY: HEEL SLIDE

## 2023-11-21 SDOH — HEALTH STABILITY: PHYSICAL HEALTH: EXERCISE ACTIVITY: HIP FLEXION

## 2023-11-21 SDOH — HEALTH STABILITY: PHYSICAL HEALTH: EXERCISE ACTIVITY: QUAD SET

## 2023-11-21 SDOH — HEALTH STABILITY: PHYSICAL HEALTH: EXERCISE ACTIVITY: HIP ABDUCTION

## 2023-11-21 SDOH — HEALTH STABILITY: PHYSICAL HEALTH: EXERCISE ACTIVITY: STRAIGHT LEG RAISE

## 2023-11-21 SDOH — HEALTH STABILITY: PHYSICAL HEALTH: EXERCISE ACTIVITY: TOE RAISE

## 2023-11-21 SDOH — HEALTH STABILITY: PHYSICAL HEALTH: EXERCISE ACTIVITY: ANKLE PUMPS

## 2023-11-21 SDOH — HEALTH STABILITY: PHYSICAL HEALTH: EXERCISE ACTIVITY: SHORT ARC QUAD

## 2023-11-21 ASSESSMENT — ENCOUNTER SYMPTOMS
LOWEST PAIN SEVERITY IN PAST 24 HOURS: 4/10
HIGHEST PAIN SEVERITY IN PAST 24 HOURS: 10/10
PAIN SEVERITY GOAL: 0/10
PAIN LOCATION: RIGHT KNEE
PAIN LOCATION - PAIN SEVERITY: 6/10
PAIN: 1

## 2023-11-22 PROCEDURE — 1090000002 HH PPS REVENUE DEBIT

## 2023-11-22 PROCEDURE — 1090000001 HH PPS REVENUE CREDIT

## 2023-11-23 PROCEDURE — 1090000001 HH PPS REVENUE CREDIT

## 2023-11-23 PROCEDURE — 1090000002 HH PPS REVENUE DEBIT

## 2023-11-24 PROCEDURE — 1090000001 HH PPS REVENUE CREDIT

## 2023-11-24 PROCEDURE — 1090000002 HH PPS REVENUE DEBIT

## 2023-11-25 PROCEDURE — 1090000002 HH PPS REVENUE DEBIT

## 2023-11-25 PROCEDURE — 1090000001 HH PPS REVENUE CREDIT

## 2023-11-26 PROCEDURE — 1090000001 HH PPS REVENUE CREDIT

## 2023-11-26 PROCEDURE — 1090000002 HH PPS REVENUE DEBIT

## 2023-11-27 PROCEDURE — 1090000001 HH PPS REVENUE CREDIT

## 2023-11-27 PROCEDURE — 1090000002 HH PPS REVENUE DEBIT

## 2023-11-27 ASSESSMENT — ACTIVITIES OF DAILY LIVING (ADL)
OASIS_M1830: 05
ENTERING_EXITING_HOME: ONE PERSON

## 2023-11-28 ENCOUNTER — TELEPHONE (OUTPATIENT)
Dept: PRIMARY CARE | Facility: CLINIC | Age: 68
End: 2023-11-28
Payer: COMMERCIAL

## 2023-11-28 ENCOUNTER — TELEPHONE (OUTPATIENT)
Dept: INPATIENT UNIT | Facility: HOSPITAL | Age: 68
End: 2023-11-28
Payer: COMMERCIAL

## 2023-11-28 ENCOUNTER — HOME CARE VISIT (OUTPATIENT)
Dept: HOME HEALTH SERVICES | Facility: HOME HEALTH | Age: 68
End: 2023-11-28
Payer: MEDICARE

## 2023-11-28 VITALS — TEMPERATURE: 97.1 F | OXYGEN SATURATION: 97 % | HEART RATE: 76 BPM | RESPIRATION RATE: 18 BRPM

## 2023-11-28 PROCEDURE — 1090000001 HH PPS REVENUE CREDIT

## 2023-11-28 PROCEDURE — 1090000002 HH PPS REVENUE DEBIT

## 2023-11-28 PROCEDURE — G0157 HHC PT ASSISTANT EA 15: HCPCS | Mod: HHH

## 2023-11-28 ASSESSMENT — ENCOUNTER SYMPTOMS
PERSON REPORTING PAIN: PATIENT
PAIN LOCATION - RELIEVING FACTORS: REST, ICE, MEDICATION
PAIN LOCATION: RIGHT KNEE
PAIN LOCATION - EXACERBATING FACTORS: ACTIVITY
PAIN: 1
PAIN LOCATION - PAIN DURATION: INTERMITTENT
SUBJECTIVE PAIN PROGRESSION: GRADUALLY IMPROVING
PAIN LOCATION - PAIN FREQUENCY: INTERMITTENT
HIGHEST PAIN SEVERITY IN PAST 24 HOURS: 5/10
PAIN LOCATION - PAIN QUALITY: PRESSURE
PAIN SEVERITY GOAL: 0/10
PAIN LOCATION - PAIN SEVERITY: 2/10
LOWEST PAIN SEVERITY IN PAST 24 HOURS: 1/10

## 2023-11-28 NOTE — TELEPHONE ENCOUNTER
Patient had knee surgery a couple weeks ago. Is off meds post surgery. Asking if he can take ibuprofen for inflammation and swelling. States he is not supposed to take ibuprofen but asking if ok to take at a low dose. If not asking what is ok to take?

## 2023-11-29 PROCEDURE — 1090000002 HH PPS REVENUE DEBIT

## 2023-11-29 PROCEDURE — 1090000001 HH PPS REVENUE CREDIT

## 2023-11-30 PROCEDURE — 1090000002 HH PPS REVENUE DEBIT

## 2023-11-30 PROCEDURE — 1090000001 HH PPS REVENUE CREDIT

## 2023-12-01 ENCOUNTER — HOME CARE VISIT (OUTPATIENT)
Dept: HOME HEALTH SERVICES | Facility: HOME HEALTH | Age: 68
End: 2023-12-01
Payer: MEDICARE

## 2023-12-01 VITALS
HEART RATE: 85 BPM | TEMPERATURE: 97.2 F | SYSTOLIC BLOOD PRESSURE: 118 MMHG | OXYGEN SATURATION: 97 % | DIASTOLIC BLOOD PRESSURE: 72 MMHG | RESPIRATION RATE: 18 BRPM

## 2023-12-01 PROCEDURE — 1090000001 HH PPS REVENUE CREDIT

## 2023-12-01 PROCEDURE — G0157 HHC PT ASSISTANT EA 15: HCPCS | Mod: HHH

## 2023-12-01 PROCEDURE — 1090000002 HH PPS REVENUE DEBIT

## 2023-12-01 ASSESSMENT — ENCOUNTER SYMPTOMS
DENIES PAIN: 1
PERSON REPORTING PAIN: PATIENT

## 2023-12-02 PROCEDURE — 1090000002 HH PPS REVENUE DEBIT

## 2023-12-02 PROCEDURE — 1090000001 HH PPS REVENUE CREDIT

## 2023-12-03 PROCEDURE — 1090000002 HH PPS REVENUE DEBIT

## 2023-12-03 PROCEDURE — 1090000001 HH PPS REVENUE CREDIT

## 2023-12-04 PROCEDURE — 1090000001 HH PPS REVENUE CREDIT

## 2023-12-04 PROCEDURE — 1090000002 HH PPS REVENUE DEBIT

## 2023-12-05 ENCOUNTER — HOME CARE VISIT (OUTPATIENT)
Dept: HOME HEALTH SERVICES | Facility: HOME HEALTH | Age: 68
End: 2023-12-05
Payer: MEDICARE

## 2023-12-05 VITALS
SYSTOLIC BLOOD PRESSURE: 120 MMHG | TEMPERATURE: 98.1 F | OXYGEN SATURATION: 99 % | DIASTOLIC BLOOD PRESSURE: 74 MMHG | HEART RATE: 70 BPM

## 2023-12-05 PROCEDURE — 1090000002 HH PPS REVENUE DEBIT

## 2023-12-05 PROCEDURE — G0157 HHC PT ASSISTANT EA 15: HCPCS | Mod: HHH

## 2023-12-05 PROCEDURE — 1090000001 HH PPS REVENUE CREDIT

## 2023-12-05 ASSESSMENT — ENCOUNTER SYMPTOMS
LOWEST PAIN SEVERITY IN PAST 24 HOURS: 3/10
PAIN LOCATION - PAIN SEVERITY: 4/10
PAIN: 1
HIGHEST PAIN SEVERITY IN PAST 24 HOURS: 5/10
PAIN LOCATION: RIGHT KNEE
PAIN SEVERITY GOAL: 0/10
PAIN LOCATION - EXACERBATING FACTORS: STILLNESS
SUBJECTIVE PAIN PROGRESSION: GRADUALLY IMPROVING

## 2023-12-06 PROCEDURE — 1090000002 HH PPS REVENUE DEBIT

## 2023-12-06 PROCEDURE — 1090000001 HH PPS REVENUE CREDIT

## 2023-12-07 ENCOUNTER — HOME CARE VISIT (OUTPATIENT)
Dept: HOME HEALTH SERVICES | Facility: HOME HEALTH | Age: 68
End: 2023-12-07
Payer: MEDICARE

## 2023-12-07 PROCEDURE — G0151 HHCP-SERV OF PT,EA 15 MIN: HCPCS | Mod: HHH

## 2023-12-07 PROCEDURE — 1090000002 HH PPS REVENUE DEBIT

## 2023-12-07 PROCEDURE — 1090000001 HH PPS REVENUE CREDIT

## 2023-12-07 SDOH — HEALTH STABILITY: PHYSICAL HEALTH
EXERCISE COMMENTS: HIP ABDUCTION, NOW IN SIDELYING  5.  HEEL SLIDES  6.  STRAIGHT LEG RAISES  DELETED 7. SHORT ARC QUADS  8.  SEATED ANKLE PUMPS  9.  SEATED FULL ARC QUADS  10. SEATED KNEE FLEXION  11.  STANDING HIP FLEXION  12.  STANDNG KNEE FLEXION  13.  STANDING T

## 2023-12-07 SDOH — HEALTH STABILITY: PHYSICAL HEALTH
EXERCISE COMMENTS: VERY ACHY TODAY.  I SPEND A LOT OF TIME ON MY FEET.  OUTPATIENT P.T. MONDAY AT UH GENEVA.    AROM R KNEE 3 TO 110 DEGREES.  TUG 12 SECONDS.    FULL REVIEW OF WRITTEN HOME EXERCISE PROGRAM.  1.  ANKLE PUMPS  2.  QUAD SETS  DELETED 3.  GLUTEAL SETS  4.

## 2023-12-07 SDOH — HEALTH STABILITY: PHYSICAL HEALTH: EXERCISE COMMENTS: OE RAISES  14.  STANDING STAIR KNEE FLEXION.

## 2023-12-07 ASSESSMENT — ENCOUNTER SYMPTOMS
HIGHEST PAIN SEVERITY IN PAST 24 HOURS: 8/10
PAIN: 1
PERSON REPORTING PAIN: PATIENT
PAIN LOCATION - PAIN SEVERITY: 8/10
SUBJECTIVE PAIN PROGRESSION: GRADUALLY IMPROVING
LOWEST PAIN SEVERITY IN PAST 24 HOURS: 3/10
PAIN LOCATION - EXACERBATING FACTORS: UPRIGHT ACTIVITY
PAIN LOCATION - PAIN QUALITY: ACHING
PAIN LOCATION - PAIN FREQUENCY: CONSTANT
PAIN LOCATION - PAIN DURATION: CONSTANT
PAIN SEVERITY GOAL: 0/10
PAIN LOCATION: RIGHT KNEE

## 2023-12-07 ASSESSMENT — ACTIVITIES OF DAILY LIVING (ADL)
AMBULATION ASSISTANCE: 1
HOME_HEALTH_OASIS: 00
AMBULATION ASSISTANCE ON FLAT SURFACES: 1
OASIS_M1830: 00
AMBULATION ASSISTANCE: INDEPENDENT
AMBULATION_DISTANCE/DURATION_TOLERATED: 700
AMBULATION ASSISTANCE ON UNEVEN SURFACES: 1

## 2023-12-07 NOTE — HOME HEALTH
Very achy today.  I spend a lot of time on my feet.  Outpatient P.T. Monday at [facility].    AROM R knee 3 to 110 degrees.  TUG 12 seconds.    Full review of written home exercise program.  1.  Ankle pumps  2.  Quad sets  deleted 3.  Gluteal sets  4.  Hip abduction, now in sidelying  5.  Heel slides  6.  Straight Leg Raises  deleted 7. Short arc quads  8.  Seated ankle pumps  9.  Seated full arc quads  10. Seated knee flexion  11.  Standing hip flexion  12.  Standng knee flexion  13.  Standing toe raises  14.  Standing stair knee flexion.  It is providing in writing and patient demonstrated understanding and competence in performing the recommended exercises.    Photographic image taken of right anterior knee wound and uploaded to the system.

## 2023-12-11 ENCOUNTER — EVALUATION (OUTPATIENT)
Dept: PHYSICAL THERAPY | Facility: HOSPITAL | Age: 68
End: 2023-12-11
Payer: MEDICARE

## 2023-12-11 DIAGNOSIS — Z96.651 S/P TKR (TOTAL KNEE REPLACEMENT), RIGHT: ICD-10-CM

## 2023-12-11 DIAGNOSIS — M25.561 RIGHT KNEE PAIN: Primary | ICD-10-CM

## 2023-12-11 PROCEDURE — 97161 PT EVAL LOW COMPLEX 20 MIN: CPT | Mod: GP | Performed by: PHYSICAL THERAPIST

## 2023-12-11 PROCEDURE — 97110 THERAPEUTIC EXERCISES: CPT | Mod: GP | Performed by: PHYSICAL THERAPIST

## 2023-12-11 ASSESSMENT — ENCOUNTER SYMPTOMS
DEPRESSION: 0
OCCASIONAL FEELINGS OF UNSTEADINESS: 0
LOSS OF SENSATION IN FEET: 0

## 2023-12-11 ASSESSMENT — PAIN - FUNCTIONAL ASSESSMENT: PAIN_FUNCTIONAL_ASSESSMENT: 0-10

## 2023-12-11 ASSESSMENT — PAIN SCALES - GENERAL: PAINLEVEL_OUTOF10: 3

## 2023-12-11 ASSESSMENT — PAIN DESCRIPTION - DESCRIPTORS: DESCRIPTORS: PRESSURE

## 2023-12-11 NOTE — PROGRESS NOTES
Physical Therapy    Physical Therapy Evaluation and Treatment      Patient Name: Talon Leone  MRN: [id number]  Today's Date: 12/11/2023  Time Calculation  Start Time: 1335  Stop Time: 1413  Time Calculation (min): 38 min    Assessment:  The patient is a 69 y/o M presenting to PT s/p R TKR 11/13/2023. The patient demonstrates decreased R LE strength, ROM, flexibility, balance, and gait. The patient is no longer using assistive device for community distances. Skilled PT warranted to address the above stated impairments, so the patient can perform FA's without increased pain or difficulty.       Plan:  OP PT Plan  Treatment/Interventions: Education/ Instruction, Gait training, Manual therapy, Neuromuscular re-education, Therapeutic activities, Therapeutic exercises  PT Plan: Skilled PT  PT Frequency: 2 times per week  Duration: 8 weeks  Onset Date: 11/13/23  Certification Period Start Date: 12/11/23  Certification Period End Date: 03/04/24  Number of Treatments Authorized: MN  Rehab Potential: Excellent  Plan of Care Agreement: Patient    Current Problem:   1. Right knee pain  Follow Up In Physical Therapy      2. S/P TKR (total knee replacement), right  Referral to Physical Therapy          Subjective    General:  The patient presents to PT s/p R TKA due to OA.  General  Reason for Referral: R TKA  Referred By: Dr. Hernandez  Precautions:  Precautions  STEADI Fall Risk Score (The score of 4 or more indicates an increased risk of falling): 1  Medical Precautions: Fall precautions (LOW)  Precautions Comment: R TKA    Pain:  Pain Assessment  Pain Assessment: 0-10  Pain Score: 3  Pain Type: Surgical pain  Pain Location: Knee  Pain Orientation: Right  Pain Descriptors: Pressure  Pain Frequency: Intermittent  8/10 at highest  1-2/10 at lowest  Home Living:  Home Living  Home Living Comment: Multi-level home, lives with spouse, bed/bath upstairs  Prior Level of Function:  Prior Function Per Pt/Caregiver Report  Level of  Fidelity: Independent with ADLs and functional transfers  Vocational: Retired  Leisure: spending time working on his classic cars  Prior Function Comments: Previously able to sit, stand, walk, negotiate steps,etc. without increased pain or difficulty.    Objective      12/11/23 1400   Specific Lower Extremity MMT   R Iliopsoas: (5/5) 4/5   L Iliopsoas: (5/5) 5/5   R Gluteals (prone): (5/5) 4/5   L Gluteals (prone): (5/5) 4+/5   R Gluteals (sidelying): (5/5) 4/5   L Gluteals (sidelying): (5/5) 4/5   R knee flexion: (5/5) 4/5   L knee flexion: (5/5) 5/5   R knee extension: (5/5) 4/5   L knee extension: (5/5) 5/5   Gait   Gait Comment Patient ambulating without AD, decreased R TKE during midstance, no AD   Flexibility   R hamstrings Min. Restrict.   Knee Functional Rating Scale   LEFS /80 55   Knee Observation   Observation Comment Incision healing well, no S/S of infection   Knee Palpation/Joint Mobility Assessment   Palpation/Joint Mobility Comment Min. Restrict. R patella   Knee AROM   R knee flexion: (140°) 112   L knee flexion: (140°) 140   R knee extension: (0°) lacks 3 degrees   L knee extension: (0°) 0 degrees     Treatments:   Reviewed his HEP from home care:  Seated ther ex 15 reps:   AP   LAQ   HIP FLEXION   HAMSTRING CURLS     Supine ther ex 15 reps B LE:   SAQ   QUAD SETS   GLUTE SETS   HEEL SLIDES   SLR   HIP ABDUCTION     Standing ther ex 15 reps B LE with B UE support:   HEEL RAISES   HIP ABDUCTION   HIP EXTENSION   HAMSTRING CURLS   MARCHING   MINI SQUATS     Added green loop to standing hip extension and abduction  Seated recumb. Bike L1 x 5 min.  Discussed using his recumbent bike at home.  EDUCATION:  Outpatient Education  Individual(s) Educated: Patient  Education Provided: Anatomy, Fall Risk, Home Exercise Program, POC    Goals:  Active       PT Problem       Patient will be I with HEP.       Start:  12/11/23    Expected End:  12/25/23            Patient will ambulate community distances  without assistive device >/= 20 minutes, no difficulty.       Start:  12/11/23    Expected End:  01/08/24            Patient will demonstrate R knee AROM >/= 0-120 degrees in order to perform transfers without difficulty.        Start:  12/11/23    Expected End:  01/22/24            Patient will demonstrate 5/5 strength with R LE MMT to improve his ability to negotiate steps, perform functional squats, and ambulate.       Start:  12/11/23    Expected End:  01/22/24            Patient will demonstrate </= 2/10 pain with sleeping > 6 hrs.        Start:  12/11/23    Expected End:  01/22/24            Patient will score >/=64/80 on LEFS to improve his function.       Start:  12/11/23    Expected End:  01/22/24

## 2023-12-15 ENCOUNTER — TREATMENT (OUTPATIENT)
Dept: PHYSICAL THERAPY | Facility: HOSPITAL | Age: 68
End: 2023-12-15
Payer: MEDICARE

## 2023-12-15 DIAGNOSIS — M25.561 RIGHT KNEE PAIN: ICD-10-CM

## 2023-12-15 PROCEDURE — 97110 THERAPEUTIC EXERCISES: CPT | Mod: GP | Performed by: PHYSICAL THERAPIST

## 2023-12-15 PROCEDURE — 97140 MANUAL THERAPY 1/> REGIONS: CPT | Mod: GP | Performed by: PHYSICAL THERAPIST

## 2023-12-15 ASSESSMENT — PAIN - FUNCTIONAL ASSESSMENT: PAIN_FUNCTIONAL_ASSESSMENT: 0-10

## 2023-12-15 ASSESSMENT — PAIN SCALES - GENERAL: PAINLEVEL_OUTOF10: 2

## 2023-12-15 NOTE — PROGRESS NOTES
"Physical Therapy    Physical Therapy Treatment    Patient Name: Talon Leone  MRN: [id number]  Today's Date: 12/15/2023  Time Calculation  Start Time: 0946  Stop Time: 1029  Time Calculation (min): 43 min      Assessment:  The patient is making excellent progress towards his PT goals. He remains compliant with his HEP at home and has improved R TKE with TE's. He is ambulating without assistive device. He is almost at full R knee flexion AROM.  Plan:  OP PT Plan  Treatment/Interventions: Education/ Instruction, Gait training, Manual therapy, Neuromuscular re-education, Therapeutic activities, Therapeutic exercises  PT Plan: Skilled PT  PT Frequency: 2 times per week  Duration: 8 weeks  Onset Date: 11/13/23  Certification Period Start Date: 12/11/23  Certification Period End Date: 03/04/24  Number of Treatments Authorized: 2 of MN  Rehab Potential: Excellent  Plan of Care Agreement: Patient    Current Problem  1. Right knee pain  Follow Up In Physical Therapy          General  PT  Visit  PT Received On: 12/15/23  Response to Previous Treatment: Patient with no complaints from previous session., Compliant with home exercise program  General  Reason for Referral: R TKA  Referred By: Dr. Hernandez    Subjective    The patient states he doesn't have significant pain today. He even had a good night of sleep last night.   Precautions  Precautions  Medical Precautions: Fall precautions (LOW)  Precautions Comment: R TKA    Pain  Pain Assessment  Pain Assessment: 0-10  Pain Score: 2  Pain Type: Surgical pain  Pain Location: Knee  Pain Orientation: Right    Objective   R knee 3- 118    Treatments:  Therapeutic Exercise  Therapeutic Exercise Performed: Yes  Therapeutic Exercise Activity 1: Scifit L1 seat 9 x 5 min.  Therapeutic Exercise Activity 2: Incine gastroc. x 60\"  Therapeutic Exercise Activity 3: R HS stretch with OP x 60\"  Therapeutic Exercise Activity 4: 6 in. step up focus on R TKE 2 x 10  Therapeutic Exercise Activity " 5: 6 in. lateral step up x 20  Therapeutic Exercise Activity 6: R TKE black band 2 x 10  Therapeutic Exercise Activity 7: R SAQ focus on TKE x 20    Manual Therapy  Manual Therapy Performed: Yes  Manual Therapy Activity 1: R patellar mobs Grade III all directions  Manual Therapy Activity 2: R knee flexion/extension with gentle OP    OP EDUCATION:  Outpatient Education  Individual(s) Educated: Patient  Education Provided: Anatomy, Fall Risk, Home Exercise Program, POC    Goals:  Active       PT Problem       Patient will be I with HEP.       Start:  12/11/23    Expected End:  12/25/23            Patient will ambulate community distances without assistive device >/= 20 minutes, no difficulty.       Start:  12/11/23    Expected End:  01/08/24            Patient will demonstrate R knee AROM >/= 0-120 degrees in order to perform transfers without difficulty.        Start:  12/11/23    Expected End:  01/22/24            Patient will demonstrate 5/5 strength with R LE MMT to improve his ability to negotiate steps, perform functional squats, and ambulate.       Start:  12/11/23    Expected End:  01/22/24            Patient will demonstrate </= 2/10 pain with sleeping > 6 hrs.        Start:  12/11/23    Expected End:  01/22/24            Patient will score >/=64/80 on LEFS to improve his function.       Start:  12/11/23    Expected End:  01/22/24

## 2023-12-18 ENCOUNTER — TREATMENT (OUTPATIENT)
Dept: PHYSICAL THERAPY | Facility: HOSPITAL | Age: 68
End: 2023-12-18
Payer: MEDICARE

## 2023-12-18 DIAGNOSIS — M25.561 RIGHT KNEE PAIN: Primary | ICD-10-CM

## 2023-12-18 PROCEDURE — 97110 THERAPEUTIC EXERCISES: CPT | Mod: GP | Performed by: PHYSICAL THERAPIST

## 2023-12-18 ASSESSMENT — PAIN SCALES - GENERAL: PAINLEVEL_OUTOF10: 5 - MODERATE PAIN

## 2023-12-18 ASSESSMENT — PAIN DESCRIPTION - DESCRIPTORS: DESCRIPTORS: SORE

## 2023-12-18 ASSESSMENT — PAIN - FUNCTIONAL ASSESSMENT: PAIN_FUNCTIONAL_ASSESSMENT: 0-10

## 2023-12-18 NOTE — PROGRESS NOTES
"Physical Therapy    Physical Therapy Treatment    Patient Name: Talon Leone  MRN: [id number]  Today's Date: 12/18/2023  Time Calculation  Start Time: 0834  Stop Time: 0913  Time Calculation (min): 39 min      Assessment:  PT Assessment  PT Assessment Results: Decreased strength, Decreased range of motion, Impaired balance, Decreased endurance  Rehab Prognosis: Excellent  Patient demonstrated good tolerance to progression of exercises without complaints of increased knee pain.     Plan:  OP PT Plan  Treatment/Interventions: Education/ Instruction, Gait training, Manual therapy, Neuromuscular re-education, Therapeutic activities, Therapeutic exercises  PT Plan: Skilled PT  PT Frequency: 2 times per week  Duration: 8 weeks  Onset Date: 11/13/23  Certification Period Start Date: 12/11/23  Certification Period End Date: 03/04/24  Number of Treatments Authorized: 3 of [place]  Rehab Potential: Excellent  Plan of Care Agreement: Patient    Current Problem  1. Right knee pain  Follow Up In Physical Therapy          General  PT  Visit  PT Received On: 12/18/23  Response to Previous Treatment: Patient with no complaints from previous session., Compliant with home exercise program  General  Reason for Referral: R TKA  Referred By: Dr. Hernandez    Subjective    Patient states that his right knee has been hurting him more lately.     Precautions  Precautions  Medical Precautions: Fall precautions  Precautions Comment: R TKA    Pain  Pain Assessment  Pain Assessment: 0-10  Pain Score: 5 - Moderate pain  Pain Location: Knee  Pain Orientation: Right  Pain Descriptors: Sore    Objective     Treatments:  Therapeutic Exercise  Therapeutic Exercise Performed: Yes  Therapeutic Exercise Activity 1: Scifit L1 seat 9 x 5 min.  Therapeutic Exercise Activity 2: Incine gastroc. x 60\"  Therapeutic Exercise Activity 3: R HS stretch with OP x 60\"  Therapeutic Exercise Activity 4: 6 in. step up focus on R TKE 2 x 10  Therapeutic Exercise Activity 5: 6 " "in. lateral step up x 20  Therapeutic Exercise Activity 6: R TKE black band 2 x 10  Therapeutic Exercise Activity 7: LAQ 2# x10 RLE  Therapeutic Exercise Activity 8: eccentric step downs 4\" step x10 RLE  Therapeutic Exercise Activity 9: SLR x20  Therapeutic Exercise Activity 10: s/l hip abd x20  Therapeutic Exercise Activity 11: STS x20  Therapeutic Exercise Activity 12: high knee marching x48'    Manual Therapy  Manual Therapy Performed: No  Manual Therapy Activity 1: R patellar mobs Grade III all directions    OP EDUCATION:  Outpatient Education  Individual(s) Educated: Patient  Education Provided: Anatomy, Fall Risk, Home Exercise Program, POC    Goals:  Active       PT Problem       Patient will be I with HEP.       Start:  12/11/23    Expected End:  12/25/23            Patient will ambulate community distances without assistive device >/= 20 minutes, no difficulty.       Start:  12/11/23    Expected End:  01/08/24            Patient will demonstrate R knee AROM >/= 0-120 degrees in order to perform transfers without difficulty.        Start:  12/11/23    Expected End:  01/22/24            Patient will demonstrate 5/5 strength with R LE MMT to improve his ability to negotiate steps, perform functional squats, and ambulate.       Start:  12/11/23    Expected End:  01/22/24            Patient will demonstrate </= 2/10 pain with sleeping > 6 hrs.        Start:  12/11/23    Expected End:  01/22/24            Patient will score >/=64/80 on LEFS to improve his function.       Start:  12/11/23    Expected End:  01/22/24              "

## 2023-12-22 ENCOUNTER — TREATMENT (OUTPATIENT)
Dept: PHYSICAL THERAPY | Facility: HOSPITAL | Age: 68
End: 2023-12-22
Payer: MEDICARE

## 2023-12-22 DIAGNOSIS — M25.561 RIGHT KNEE PAIN: ICD-10-CM

## 2023-12-22 DIAGNOSIS — M25.561 ACUTE PAIN OF RIGHT KNEE: Primary | ICD-10-CM

## 2023-12-22 PROCEDURE — 97140 MANUAL THERAPY 1/> REGIONS: CPT | Mod: GP,CQ

## 2023-12-22 PROCEDURE — 97110 THERAPEUTIC EXERCISES: CPT | Mod: GP,CQ

## 2023-12-22 ASSESSMENT — PAIN SCALES - GENERAL: PAINLEVEL_OUTOF10: 2

## 2023-12-22 ASSESSMENT — PAIN - FUNCTIONAL ASSESSMENT: PAIN_FUNCTIONAL_ASSESSMENT: 0-10

## 2023-12-22 NOTE — PROGRESS NOTES
Physical Therapy    Physical Therapy Treatment    Patient Name: Talon Leone  MRN: [id number]  Today's Date: 12/22/2023  Time Calculation  Start Time: 0955  Stop Time: 1035  Time Calculation (min): 40 min      Assessment:Pt demonstrating improvement with achieving TKE with exercises.  Pt had tendency to lean to the R with attempts to perform R SLS, so had pt put opposite toe down lightly, which improved positioning.  PT Assessment  PT Assessment Results: Decreased strength, Decreased range of motion, Impaired balance, Decreased endurance  Rehab Prognosis: Excellent  Plan:Continue to progress current POC as tolerated to facilitate ability to perform functional activities.   OP PT Plan  PT Plan: Skilled PT  PT Frequency: 2 times per week  Duration: 8 weeks  [place] of Treatments Authorized: 4 of [place]    Current Problem  1. Acute pain of right knee        2. Right knee pain  Follow Up In Physical Therapy          General  PT  Visit  PT Received On: 12/22/23  Response to Previous Treatment: Patient with no complaints from previous session.  General  Reason for Referral: R TKA  Referred By: Dr. Hernandez    Subjective  Pt reports he is noticing improved tolerance with standing and walking.    Precautions     Vital Signs     Pain  Pain Assessment  Pain Assessment: 0-10  Pain Score: 2  Pain Type: Surgical pain  Pain Location: Knee  Pain Orientation: Right    Objective   Cognition     Posture     Extremity/Trunk Assessment  AROM RLE (degrees)  R Knee Extension 0-130: 0    Activity Tolerance:     Outcome Measures:    Treatments:  Therapeutic Exercise  Therapeutic Exercise Performed: Yes  Therapeutic Exercise Activity 1: Scifit L1 seat 9 x 5 min.  Therapeutic Exercise Activity 4: 6 in. step up focus on R TKE 2 x 10  Therapeutic Exercise Activity 5: 6 in. lateral step up x 20  Therapeutic Exercise Activity 6: R TKE with HR black band 2 x 10  Therapeutic Exercise Activity 8: standing hip extension and abduction x 10 R/L  Therapeutic  "Exercise Activity 9: side step with band 10' x 4  Therapeutic Exercise Activity 12: SLS with opposite toe down 30\"    Manual Therapy  Manual Therapy Performed: Yes  Manual Therapy Activity 1: R knee extension mobs to increase mobility  Manual Therapy Activity 2: IASTM R vastus lateralis to decrease mm tension    OP EDUCATION:  Outpatient Education  Individual(s) Educated: Patient  Education Provided: Home Exercise Program  Patient Response to Education: Patient/Caregiver Verbalized Understanding of Information    Goals:  Active       PT Problem       Patient will be I with HEP.       Start:  12/11/23    Expected End:  12/25/23            Patient will ambulate community distances without assistive device >/= 20 minutes, no difficulty.       Start:  12/11/23    Expected End:  01/08/24            Patient will demonstrate R knee AROM >/= 0-120 degrees in order to perform transfers without difficulty.        Start:  12/11/23    Expected End:  01/22/24            Patient will demonstrate 5/5 strength with R LE MMT to improve his ability to negotiate steps, perform functional squats, and ambulate.       Start:  12/11/23    Expected End:  01/22/24            Patient will demonstrate </= 2/10 pain with sleeping > 6 hrs.        Start:  12/11/23    Expected End:  01/22/24            Patient will score >/=64/80 on LEFS to improve his function.       Start:  12/11/23    Expected End:  01/22/24              "

## 2023-12-26 ENCOUNTER — TREATMENT (OUTPATIENT)
Dept: PHYSICAL THERAPY | Facility: HOSPITAL | Age: 68
End: 2023-12-26
Payer: MEDICARE

## 2023-12-26 DIAGNOSIS — M25.561 RIGHT KNEE PAIN: ICD-10-CM

## 2023-12-26 PROCEDURE — 97110 THERAPEUTIC EXERCISES: CPT | Mod: GP,CQ

## 2023-12-26 PROCEDURE — 97140 MANUAL THERAPY 1/> REGIONS: CPT | Mod: GP,CQ

## 2023-12-26 ASSESSMENT — PAIN SCALES - GENERAL: PAINLEVEL_OUTOF10: 4

## 2023-12-26 ASSESSMENT — PAIN DESCRIPTION - DESCRIPTORS: DESCRIPTORS: SORE

## 2023-12-26 ASSESSMENT — PAIN - FUNCTIONAL ASSESSMENT: PAIN_FUNCTIONAL_ASSESSMENT: 0-10

## 2023-12-26 NOTE — PROGRESS NOTES
"Physical Therapy    Physical Therapy Treatment    Patient Name: Talon Leone  MRN: [id number]  Today's Date: 12/26/2023     Time Calculation  Start Time: 0925  Stop Time: 1007  Time Calculation (min): 42 min    Assessment/Plan   PT Assessment  PT Assessment Results: Decreased strength, Decreased range of motion, Impaired balance, Decreased endurance  Rehab Prognosis: Excellent  PT Plan  Inpatient/Swing Bed or Outpatient: Outpatient  OP PT Plan  Treatment/Interventions: Education/ Instruction  PT Plan: Skilled PT  PT Frequency: 2 times per week  Duration: 8 weeks  Number of Treatments Authorized: 5 of [place]  Rehab Potential: Excellent  Plan of Care Agreement: Patient      General Visit Information:   PT  Visit  PT Received On: 12/26/23  Response to Previous Treatment: Patient with no complaints from previous session.  General  Reason for Referral: R TKA  Referred By: Dr. Hernandez    Subjective  Pt. Reports he has trouble sleeping at night and still gets pain in both LEs  Precautions:  Precautions  Medical Precautions: Fall precautions  Precautions Comment: R TKA    Objective Pt. Working on LE strengthening exercises, stretching  to increase knee support.and return to normal functional activities after TKR  Pain:  Pain Assessment  Pain Assessment: 0-10  Pain Score: 4  Pain Type: Surgical pain  Pain Location: Knee  Pain Orientation: Right, Left  Pain Descriptors: Sore    Treatments:  Therapeutic Exercise  Therapeutic Exercise Performed: Yes  Therapeutic Exercise Activity 1: Scifit L1 seat 9 x 5 min.  Therapeutic Exercise Activity 2: Incine gastroc. x 60\"  Therapeutic Exercise Activity 4: 6 in. step up focus on R TKE 2 x 10  Therapeutic Exercise Activity 5: 6 in. lateral step up x 20  Therapeutic Exercise Activity 6: R TKE with HR black band 2 x 10  Therapeutic Exercise Activity 7: LAQ 2# x10 RLE  Therapeutic Exercise Activity 8: standing hip extension and abduction x 10 R/L  Therapeutic Exercise Activity 9: side step with " band 10' x 4  Therapeutic Exercise Activity 10: s/l hip abd x20  Therapeutic Exercise Activity 11: STS x20  Therapeutic Exercise Activity 12: SLS with hip hinge x 10 ea.    OP EDUCATION:  Outpatient Education  Individual(s) Educated: Patient  Education Provided: Home Exercise Program  Patient Response to Education: Patient/Caregiver Verbalized Understanding of Information    Active       PT Problem       Patient will be I with HEP.       Start:  12/11/23    Expected End:  12/25/23            Patient will ambulate community distances without assistive device >/= 20 minutes, no difficulty.       Start:  12/11/23    Expected End:  01/08/24            Patient will demonstrate R knee AROM >/= 0-120 degrees in order to perform transfers without difficulty.        Start:  12/11/23    Expected End:  01/22/24            Patient will demonstrate 5/5 strength with R LE MMT to improve his ability to negotiate steps, perform functional squats, and ambulate.       Start:  12/11/23    Expected End:  01/22/24            Patient will demonstrate </= 2/10 pain with sleeping > 6 hrs.        Start:  12/11/23    Expected End:  01/22/24            Patient will score >/=64/80 on LEFS to improve his function.       Start:  12/11/23    Expected End:  01/22/24

## 2023-12-28 ENCOUNTER — SPECIALTY PHARMACY (OUTPATIENT)
Dept: PHARMACY | Facility: CLINIC | Age: 68
End: 2023-12-28

## 2023-12-28 DIAGNOSIS — E78.5 DYSLIPIDEMIA: Primary | ICD-10-CM

## 2023-12-28 NOTE — PROGRESS NOTES
[facility] Clinical Note  Repatha Reassessment Call    Talon Leone is a 68 y.o. male, who is on the specialty pharmacy service for management of: Hyperlipidemia Core with status of: (Enrolled).      Talon was contacted on 12/28/2023. He continues on Repatha as prescribed with no reported side effects. He appears overdue for repeat lipid panel to assess efficacy. Patient also reports no longer taking Zetia (has been awhile since taking this and unsure when/why it was maybe stopped). I recommended he discuss this with his doctor to see if they want to do a repeat lipid panel to re-assess efficacy of Repatha and maybe consider resuming Zetia, if appropriate.     Refer to the encounter summary report for documentation details about patient counseling and education.      Medication Adherence  The importance of adherence was discussed with the patient and they were advised to take the medication as prescribed by their provider. Talon was encouraged to call his physician's office if they have a question regarding a missed dose.        Patient advised to contact the pharmacy if there are any changes to her medication list, including prescriptions, OTC medications, herbal products, or supplements. Patient was advised of [facility]’s dispensing process, refill timeline, contact information ([phone]), and patient management follow up. Patient confirmed understanding of education conducted during assessment. All patient questions and concerns were addressed to the best of my ability. Patient was encouraged to contact the specialty pharmacy with any questions or concerns.    Confirmed follow-up outreaches are properly scheduled. Reviewed goals of therapy in the program targets.  IMPRESSION/PLAN:  Is patient high risk (potential patients:  pregnancy, geriatric, pediatric)?  Yes- geriatric with PMH significant for HLD, CAD s/p PTCA, history of smoking  Is laboratory  follow-up needed? Yes  Is a clinical intervention needed? Patient to discuss regimen/getting repeat labs with provider since it is unclear when/why Zetia was stopped awhile back  Next reassessment date? Approx. 06/28/24  Additional comments:       Nuha Molina, PharmD

## 2023-12-29 ENCOUNTER — TREATMENT (OUTPATIENT)
Dept: PHYSICAL THERAPY | Facility: HOSPITAL | Age: 68
End: 2023-12-29
Payer: MEDICARE

## 2023-12-29 DIAGNOSIS — M25.561 RIGHT KNEE PAIN: ICD-10-CM

## 2023-12-29 DIAGNOSIS — M25.561 ACUTE PAIN OF RIGHT KNEE: Primary | ICD-10-CM

## 2023-12-29 PROCEDURE — RXMED WILLOW AMBULATORY MEDICATION CHARGE

## 2023-12-29 PROCEDURE — 97140 MANUAL THERAPY 1/> REGIONS: CPT | Mod: GP | Performed by: PHYSICAL THERAPIST

## 2023-12-29 PROCEDURE — 97110 THERAPEUTIC EXERCISES: CPT | Mod: GP | Performed by: PHYSICAL THERAPIST

## 2023-12-29 ASSESSMENT — PAIN DESCRIPTION - DESCRIPTORS: DESCRIPTORS: SORE

## 2023-12-29 ASSESSMENT — PAIN SCALES - GENERAL: PAINLEVEL_OUTOF10: 2

## 2023-12-29 ASSESSMENT — PAIN - FUNCTIONAL ASSESSMENT: PAIN_FUNCTIONAL_ASSESSMENT: 0-10

## 2023-12-29 NOTE — PROGRESS NOTES
Physical Therapy    Physical Therapy Treatment and Discharge     Patient Name: Talon Leone  MRN: [id number]  Today's Date: 12/29/2023  Time Calculation  Start Time: 1455  Stop Time: 1533  Time Calculation (min): 38 min    Assessment:  PT Assessment  Evaluation/Treatment Tolerance: Patient tolerated treatment well  Patient demonstrated good progress towards physical therapy goals. He met most of his physical therapy goals and is appropriate to discharge from physical therapy at this time. Patient was instructed to continue to perform his home exercise program and ice his knee as needed. Patient agrees with plan to discharge to home exercise program.    Plan:  OP PT Plan  PT Plan: No Additional PT interventions required at this time  Onset Date: 11/13/23  Certification Period Start Date: 12/11/23  Certification Period End Date: 03/04/24  Number of Treatments Authorized: 6  Plan of Care Agreement: Patient    Current Problem  1. Acute pain of right knee        2. Right knee pain  Follow Up In Physical Therapy          General  PT  Visit  PT Received On: 12/29/23  General  Reason for Referral: R TKA  Referred By: Dr. Hernandez  Patient states that his knee is feeling good and is wondering if he's good enough to discharge from physical therapy.    Subjective    Precautions  Precautions  Medical Precautions: Fall precautions  Post-Surgical Precautions: Right total knee precautions    Pain  Pain Assessment  Pain Assessment: 0-10  Pain Score: 2  Pain Type: Surgical pain  Pain Location: Knee  Pain Orientation: Right  Pain Descriptors: Sore    Objective     Extremity/Trunk Assessment  AROM RLE (degrees)  R Knee Flexion 0-130: 124  R Knee Extension 0-130: 0  Strength RLE  R Knee Flexion: 5/5  R Knee Extension: 5/5     Outcome Measures:  LEFS=59/80    Treatments:  Therapeutic Exercise  Therapeutic Exercise Performed: Yes  Therapeutic Exercise Activity 1: Scifit L1 seat 9 x 5 min.  Therapeutic Exercise Activity 2: Incine gastroc.  "x 60\"  Therapeutic Exercise Activity 4: 6 in. step up focus on R TKE 2 x 10  Therapeutic Exercise Activity 5: 6 in. lateral step up x 20  Therapeutic Exercise Activity 6: R TKE with HR black band 2 x 10  Therapeutic Exercise Activity 7: LAQ 3# x20 RLE  Therapeutic Exercise Activity 8: standing hip extension and abduction grn band x10 R/L  Therapeutic Exercise Activity 12: SLS with hip hinge x 10 ea.  Therapeutic Exercise Activity 13: seated hamstring curls red band x10    Manual Therapy  Manual Therapy Performed: Yes  Manual Therapy Activity 2: IASTM R vastus lateralis to decrease mm tension    OP EDUCATION:  Outpatient Education  Individual(s) Educated: Patient  Education Provided: Home Exercise Program, POC  Patient/Caregiver Demonstrated Understanding: yes  Plan of Care Discussed and Agreed Upon: yes  Patient Response to Education: Patient/Caregiver Verbalized Understanding of Information    Goals:  Active       PT Problem       Patient will be I with HEP. (Met)       Start:  12/11/23    Expected End:  12/25/23    Resolved:  12/29/23         Patient will ambulate community distances without assistive device >/= 20 minutes, no difficulty. (Met)       Start:  12/11/23    Expected End:  01/08/24    Resolved:  12/29/23         Patient will demonstrate R knee AROM >/= 0-120 degrees in order to perform transfers without difficulty.  (Met)       Start:  12/11/23    Expected End:  01/22/24    Resolved:  12/29/23         Patient will demonstrate 5/5 strength with R LE MMT to improve his ability to negotiate steps, perform functional squats, and ambulate. (Met)       Start:  12/11/23    Expected End:  01/22/24    Resolved:  12/29/23         Patient will demonstrate </= 2/10 pain with sleeping > 6 hrs.  (Met)       Start:  12/11/23    Expected End:  01/22/24    Resolved:  12/29/23         Patient will score >/=64/80 on LEFS to improve his function. (Progressing)       Start:  12/11/23    Expected End:  01/22/24          "

## 2024-01-05 ENCOUNTER — SPECIALTY PHARMACY (OUTPATIENT)
Dept: PHARMACY | Facility: CLINIC | Age: 69
End: 2024-01-05

## 2024-01-07 DIAGNOSIS — Z98.61 S/P PTCA (PERCUTANEOUS TRANSLUMINAL CORONARY ANGIOPLASTY): ICD-10-CM

## 2024-01-08 RX ORDER — METOPROLOL SUCCINATE 25 MG/1
TABLET, EXTENDED RELEASE ORAL
Qty: 90 TABLET | Refills: 1 | Status: SHIPPED | OUTPATIENT
Start: 2024-01-08 | End: 2024-03-12 | Stop reason: SDUPTHER

## 2024-01-09 ENCOUNTER — PHARMACY VISIT (OUTPATIENT)
Dept: PHARMACY | Facility: CLINIC | Age: 69
End: 2024-01-09
Payer: COMMERCIAL

## 2024-01-15 ENCOUNTER — OFFICE VISIT (OUTPATIENT)
Dept: PRIMARY CARE | Facility: CLINIC | Age: 69
End: 2024-01-15
Payer: MEDICARE

## 2024-01-15 VITALS
OXYGEN SATURATION: 97 % | WEIGHT: 199 LBS | TEMPERATURE: 97.1 F | HEART RATE: 80 BPM | BODY MASS INDEX: 30.26 KG/M2 | DIASTOLIC BLOOD PRESSURE: 82 MMHG | SYSTOLIC BLOOD PRESSURE: 124 MMHG

## 2024-01-15 DIAGNOSIS — R00.2 PALPITATIONS: ICD-10-CM

## 2024-01-15 DIAGNOSIS — E53.8 VITAMIN B12 DEFICIENCY: ICD-10-CM

## 2024-01-15 DIAGNOSIS — Z12.5 SCREENING FOR PROSTATE CANCER: ICD-10-CM

## 2024-01-15 DIAGNOSIS — R53.83 OTHER FATIGUE: ICD-10-CM

## 2024-01-15 DIAGNOSIS — E55.9 VITAMIN D DEFICIENCY, UNSPECIFIED: ICD-10-CM

## 2024-01-15 DIAGNOSIS — I10 HYPERTENSION, UNSPECIFIED TYPE: ICD-10-CM

## 2024-01-15 DIAGNOSIS — J44.9 CHRONIC OBSTRUCTIVE PULMONARY DISEASE, UNSPECIFIED COPD TYPE (MULTI): ICD-10-CM

## 2024-01-15 DIAGNOSIS — M25.421 ELBOW SWELLING, RIGHT: Primary | ICD-10-CM

## 2024-01-15 DIAGNOSIS — E78.00 HYPERCHOLESTEREMIA: ICD-10-CM

## 2024-01-15 DIAGNOSIS — Z98.61 S/P PTCA (PERCUTANEOUS TRANSLUMINAL CORONARY ANGIOPLASTY): ICD-10-CM

## 2024-01-15 DIAGNOSIS — Z79.899 HIGH RISK MEDICATION USE: ICD-10-CM

## 2024-01-15 PROBLEM — F19.21 HISTORY OF SUBSTANCE DEPENDENCE (MULTI): Status: ACTIVE | Noted: 2024-01-15

## 2024-01-15 PROBLEM — F19.21 HISTORY OF SUBSTANCE DEPENDENCE (MULTI): Status: RESOLVED | Noted: 2024-01-15 | Resolved: 2024-01-15

## 2024-01-15 PROCEDURE — 99214 OFFICE O/P EST MOD 30 MIN: CPT | Performed by: INTERNAL MEDICINE

## 2024-01-15 PROCEDURE — 3079F DIAST BP 80-89 MM HG: CPT | Performed by: INTERNAL MEDICINE

## 2024-01-15 PROCEDURE — 1160F RVW MEDS BY RX/DR IN RCRD: CPT | Performed by: INTERNAL MEDICINE

## 2024-01-15 PROCEDURE — 1159F MED LIST DOCD IN RCRD: CPT | Performed by: INTERNAL MEDICINE

## 2024-01-15 PROCEDURE — 3074F SYST BP LT 130 MM HG: CPT | Performed by: INTERNAL MEDICINE

## 2024-01-15 PROCEDURE — 1036F TOBACCO NON-USER: CPT | Performed by: INTERNAL MEDICINE

## 2024-01-15 PROCEDURE — 1125F AMNT PAIN NOTED PAIN PRSNT: CPT | Performed by: INTERNAL MEDICINE

## 2024-01-15 PROCEDURE — 93000 ELECTROCARDIOGRAM COMPLETE: CPT | Performed by: INTERNAL MEDICINE

## 2024-01-15 RX ORDER — PREDNISONE 20 MG/1
20 TABLET ORAL DAILY
Qty: 7 TABLET | Refills: 0 | Status: SHIPPED | OUTPATIENT
Start: 2024-01-15 | End: 2024-01-25 | Stop reason: WASHOUT

## 2024-01-15 RX ORDER — NAPROXEN SODIUM 220 MG/1
81 TABLET, FILM COATED ORAL DAILY
COMMUNITY
Start: 2023-04-20

## 2024-01-15 ASSESSMENT — ENCOUNTER SYMPTOMS
BLOOD IN STOOL: 0
ABDOMINAL PAIN: 0
ARTHRALGIAS: 0
SINUS PAIN: 0
UNEXPECTED WEIGHT CHANGE: 0
DIZZINESS: 0
COUGH: 0
JOINT SWELLING: 1
DIFFICULTY URINATING: 0
SORE THROAT: 0
FATIGUE: 0
FEVER: 0
DIARRHEA: 0
HEADACHES: 0
PALPITATIONS: 1
BRUISES/BLEEDS EASILY: 0
WHEEZING: 0

## 2024-01-15 NOTE — PROGRESS NOTES
Subjective   Patient ID: Talon Leone is a 68 y.o. male who presents for Atrial Fibrillation (In last week or 2) and Elbow Pain (X 5 days loss of range of motion).    - Patient comes today complaining of right elbow swelling unable to hyperextend denies any falling denies any other complaints physical exam tenderness and swelling of the right elbow etiology to be determined  Obtain x-ray right elbow  - Patient need to avoid any NSAIDs due to underlying coronary artery disease  Start patient on prednisone 20 mg daily follow-up results closely  - Patient woke up from sleep couple of nights on palpitation rapid heartbeat  EKG today sinus rhythm underlying history of coronary artery disease avoid any arthritis medication  Patient need to be seen by cardiology for further eval recommendation  - Needs to complete blood work and Medicare physical in 2 to 3 months  - Patient underwent total right knee arthroplasty uneventful surgery patient recovering very well  - - Hypercholesterolemia patient not controlled now doing better need to restart the Zetia again  - Statin intolerance continue Repatha as recommended lipid profile controlled  - Benign prostatic hypertrophy symptoms improving now continue with current medication  - Coronary artery disease compensated continue with aspirin daily  - COPD compensated on Breo as needed doing well  -Screening colonoscopy obtained results reviewed from July 2020 3 repeat in 5 years July 2028 with Dr. Delcid    Follow-up results closely follow-up 3 months Medicare physical    Atrial Fibrillation  Symptoms include palpitations. Symptoms are negative for chest pain and dizziness. Past medical history includes atrial fibrillation.   Elbow Pain  Associated symptoms include joint swelling. Pertinent negatives include no abdominal pain, arthralgias, chest pain, congestion, coughing, fatigue, fever, headaches, rash or sore throat.          Review of Systems   Constitutional:  Negative for  fatigue, fever and unexpected weight change.   HENT:  Negative for congestion, ear discharge, ear pain, mouth sores, sinus pain and sore throat.    Eyes:  Negative for visual disturbance.   Respiratory:  Negative for cough and wheezing.    Cardiovascular:  Positive for palpitations. Negative for chest pain and leg swelling.   Gastrointestinal:  Negative for abdominal pain, blood in stool and diarrhea.   Genitourinary:  Negative for difficulty urinating.   Musculoskeletal:  Positive for joint swelling. Negative for arthralgias.   Skin:  Negative for rash.   Neurological:  Negative for dizziness and headaches.   Hematological:  Does not bruise/bleed easily.   Psychiatric/Behavioral:  Negative for behavioral problems.    All other systems reviewed and are negative.      Objective   Lab Results   Component Value Date    HGBA1C 5.0 04/04/2023      /82   Pulse 80   Temp 36.2 °C (97.1 °F)   Wt 90.3 kg (199 lb)   SpO2 97%   BMI 30.26 kg/m²   Lab Results   Component Value Date    WBC 6.4 11/07/2023    HGB 15.0 11/07/2023    HCT 44.9 11/07/2023     11/07/2023    CHOL 115 08/03/2022    TRIG 83 08/03/2022    HDL 46.0 08/03/2022    ALT 45 04/04/2023    AST 29 04/04/2023     11/07/2023    K 4.2 11/07/2023     11/07/2023    CREATININE 0.77 11/07/2023    BUN 13 11/07/2023    CO2 28 11/07/2023    TSH 3.01 08/03/2022    PSA 3.0 03/21/2018    INR 0.9 05/19/2021    HGBA1C 5.0 04/04/2023     par   Physical Exam  Vitals and nursing note reviewed.   Constitutional:       Appearance: Normal appearance.   HENT:      Head: Normocephalic.      Nose: Nose normal.   Eyes:      Conjunctiva/sclera: Conjunctivae normal.      Pupils: Pupils are equal, round, and reactive to light.   Cardiovascular:      Rate and Rhythm: Regular rhythm.   Pulmonary:      Effort: Pulmonary effort is normal.      Breath sounds: Normal breath sounds.   Abdominal:      General: Abdomen is flat.      Palpations: Abdomen is soft.    Musculoskeletal:         General: Deformity (Right knee swelling limited hyperextension due to pain) present.      Cervical back: Neck supple.   Skin:     General: Skin is warm.   Neurological:      General: No focal deficit present.      Mental Status: He is oriented to person, place, and time.   Psychiatric:         Mood and Affect: Mood normal.         Assessment/Plan   Talon was seen today for atrial fibrillation and elbow pain.  Diagnoses and all orders for this visit:  Elbow swelling, right (Primary)  -     XR elbow right 3+ views; Future  -     predniSONE (Deltasone) 20 mg tablet; Take 1 tablet (20 mg) by mouth once daily for 7 days.  Palpitations  Chronic obstructive pulmonary disease, unspecified COPD type (CMS/HCC)  High risk medication use  -     CBC and Auto Differential; Future  Hypertension, unspecified type  -     Comprehensive Metabolic Panel; Future  -     ECG 12 Lead  Hypercholesteremia  -     Lipid Panel; Future  Screening for prostate cancer  -     Prostate Specific Antigen, Screen; Future  Other fatigue  -     TSH with reflex to Free T4 if abnormal; Future  Vitamin B12 deficiency  -     Vitamin B12; Future  Vitamin D deficiency, unspecified  -     Vitamin D 25-Hydroxy,Total (for eval of Vitamin D levels); Future  S/P PTCA (percutaneous transluminal coronary angioplasty)   - Patient comes today complaining of right elbow swelling unable to hyperextend denies any falling denies any other complaints physical exam tenderness and swelling of the right elbow etiology to be determined  Obtain x-ray right elbow  - Patient need to avoid any NSAIDs due to underlying coronary artery disease  Start patient on prednisone 20 mg daily follow-up results closely  - Patient woke up from sleep couple of nights on palpitation rapid heartbeat  EKG today sinus rhythm underlying history of coronary artery disease avoid any arthritis medication  Patient need to be seen by cardiology for further eval recommendation  -  Needs to complete blood work and Medicare physical in 2 to 3 months  - Patient underwent total right knee arthroplasty uneventful surgery patient recovering very well  - - Hypercholesterolemia patient not controlled now doing better need to restart the Zetia again  - Statin intolerance continue Repatha as recommended lipid profile controlled  - Benign prostatic hypertrophy symptoms improving now continue with current medication  - Coronary artery disease compensated continue with aspirin daily  - COPD compensated on Breo as needed doing well  -Screening colonoscopy obtained results reviewed from July 2020 3 repeat in 5 years July 2028 with Dr. Delcid    Follow-up results closely follow-up 3 months Medicare physical

## 2024-01-16 ENCOUNTER — HOSPITAL ENCOUNTER (OUTPATIENT)
Dept: RADIOLOGY | Facility: HOSPITAL | Age: 69
Discharge: HOME | End: 2024-01-16
Payer: MEDICARE

## 2024-01-16 DIAGNOSIS — M25.421 ELBOW SWELLING, RIGHT: ICD-10-CM

## 2024-01-16 PROCEDURE — 73080 X-RAY EXAM OF ELBOW: CPT | Mod: RIGHT SIDE | Performed by: RADIOLOGY

## 2024-01-16 PROCEDURE — 73080 X-RAY EXAM OF ELBOW: CPT | Mod: RT

## 2024-01-25 ENCOUNTER — OFFICE VISIT (OUTPATIENT)
Dept: CARDIOLOGY | Facility: CLINIC | Age: 69
End: 2024-01-25
Payer: MEDICARE

## 2024-01-25 ENCOUNTER — LAB (OUTPATIENT)
Dept: LAB | Facility: LAB | Age: 69
End: 2024-01-25
Payer: MEDICARE

## 2024-01-25 ENCOUNTER — HOSPITAL ENCOUNTER (OUTPATIENT)
Dept: CARDIOLOGY | Facility: HOSPITAL | Age: 69
Discharge: HOME | End: 2024-01-25
Payer: MEDICARE

## 2024-01-25 VITALS
HEART RATE: 70 BPM | HEIGHT: 68 IN | WEIGHT: 192 LBS | SYSTOLIC BLOOD PRESSURE: 126 MMHG | DIASTOLIC BLOOD PRESSURE: 82 MMHG | BODY MASS INDEX: 29.1 KG/M2 | OXYGEN SATURATION: 98 %

## 2024-01-25 DIAGNOSIS — R00.2 PALPITATIONS: ICD-10-CM

## 2024-01-25 DIAGNOSIS — I25.10 CORONARY ARTERY DISEASE INVOLVING NATIVE CORONARY ARTERY OF NATIVE HEART WITHOUT ANGINA PECTORIS: ICD-10-CM

## 2024-01-25 DIAGNOSIS — I37.1 MODERATE PULMONARY VALVE REGURGITATION: Primary | ICD-10-CM

## 2024-01-25 DIAGNOSIS — R07.89 CHEST PRESSURE: ICD-10-CM

## 2024-01-25 DIAGNOSIS — Z98.61 S/P PTCA (PERCUTANEOUS TRANSLUMINAL CORONARY ANGIOPLASTY): ICD-10-CM

## 2024-01-25 DIAGNOSIS — E78.2 MIXED HYPERLIPIDEMIA: ICD-10-CM

## 2024-01-25 DIAGNOSIS — I10 PRIMARY HYPERTENSION: ICD-10-CM

## 2024-01-25 DIAGNOSIS — G47.33 OSA (OBSTRUCTIVE SLEEP APNEA): ICD-10-CM

## 2024-01-25 LAB
ANION GAP SERPL CALC-SCNC: 12 MMOL/L (ref 10–20)
AORTIC VALVE PEAK VELOCITY: 1.13 M/S
AV PEAK GRADIENT: 5.1 MMHG
AVA (PEAK VEL): 2.97 CM2
BUN SERPL-MCNC: 15 MG/DL (ref 6–23)
CALCIUM SERPL-MCNC: 9.5 MG/DL (ref 8.6–10.3)
CHLORIDE SERPL-SCNC: 104 MMOL/L (ref 98–107)
CO2 SERPL-SCNC: 26 MMOL/L (ref 21–32)
CREAT SERPL-MCNC: 0.92 MG/DL (ref 0.5–1.3)
EGFRCR SERPLBLD CKD-EPI 2021: 89 ML/MIN/1.73M*2
EJECTION FRACTION APICAL 4 CHAMBER: 63.3
EJECTION FRACTION: 62 %
GLUCOSE SERPL-MCNC: 137 MG/DL (ref 74–99)
LEFT ATRIUM VOLUME AREA LENGTH INDEX BSA: 20.6 ML/M2
LEFT VENTRICLE INTERNAL DIMENSION DIASTOLE: 5.03 CM (ref 3.5–6)
LEFT VENTRICULAR OUTFLOW TRACT DIAMETER: 2.3 CM
MAGNESIUM SERPL-MCNC: 2.21 MG/DL (ref 1.6–2.4)
MITRAL VALVE E/A RATIO: 0.53
MITRAL VALVE E/E' RATIO: 5.79
POTASSIUM SERPL-SCNC: 4.4 MMOL/L (ref 3.5–5.3)
RIGHT VENTRICLE FREE WALL PEAK S': 16.8 CM/S
RIGHT VENTRICLE PEAK SYSTOLIC PRESSURE: 21.5 MMHG
SODIUM SERPL-SCNC: 138 MMOL/L (ref 136–145)
TRICUSPID ANNULAR PLANE SYSTOLIC EXCURSION: 1.7 CM
TSH SERPL-ACNC: 1.33 MIU/L (ref 0.44–3.98)

## 2024-01-25 PROCEDURE — 93306 TTE W/DOPPLER COMPLETE: CPT | Performed by: STUDENT IN AN ORGANIZED HEALTH CARE EDUCATION/TRAINING PROGRAM

## 2024-01-25 PROCEDURE — 99214 OFFICE O/P EST MOD 30 MIN: CPT | Performed by: NURSE PRACTITIONER

## 2024-01-25 PROCEDURE — 93246 EXT ECG>7D<15D RECORDING: CPT

## 2024-01-25 PROCEDURE — 93248 EXT ECG>7D<15D REV&INTERPJ: CPT | Performed by: INTERNAL MEDICINE

## 2024-01-25 PROCEDURE — 36415 COLL VENOUS BLD VENIPUNCTURE: CPT

## 2024-01-25 PROCEDURE — 1036F TOBACCO NON-USER: CPT | Performed by: NURSE PRACTITIONER

## 2024-01-25 PROCEDURE — 3074F SYST BP LT 130 MM HG: CPT | Performed by: NURSE PRACTITIONER

## 2024-01-25 PROCEDURE — 1125F AMNT PAIN NOTED PAIN PRSNT: CPT | Performed by: NURSE PRACTITIONER

## 2024-01-25 PROCEDURE — 1160F RVW MEDS BY RX/DR IN RCRD: CPT | Performed by: NURSE PRACTITIONER

## 2024-01-25 PROCEDURE — 1159F MED LIST DOCD IN RCRD: CPT | Performed by: NURSE PRACTITIONER

## 2024-01-25 PROCEDURE — 2500000004 HC RX 250 GENERAL PHARMACY W/ HCPCS (ALT 636 FOR OP/ED): Performed by: NURSE PRACTITIONER

## 2024-01-25 PROCEDURE — 3079F DIAST BP 80-89 MM HG: CPT | Performed by: NURSE PRACTITIONER

## 2024-01-25 PROCEDURE — 93306 TTE W/DOPPLER COMPLETE: CPT

## 2024-01-25 PROCEDURE — 80048 BASIC METABOLIC PNL TOTAL CA: CPT

## 2024-01-25 PROCEDURE — 83735 ASSAY OF MAGNESIUM: CPT

## 2024-01-25 PROCEDURE — 84443 ASSAY THYROID STIM HORMONE: CPT

## 2024-01-25 RX ORDER — TESTOSTERONE 20.25 MG/1.25G
GEL TOPICAL
COMMUNITY
Start: 2010-04-12 | End: 2024-01-25 | Stop reason: ALTCHOICE

## 2024-01-25 RX ORDER — ATORVASTATIN CALCIUM 40 MG/1
TABLET, FILM COATED ORAL
COMMUNITY
Start: 2017-05-15 | End: 2024-01-25 | Stop reason: ALTCHOICE

## 2024-01-25 RX ORDER — OXYCODONE HYDROCHLORIDE 5 MG/1
TABLET ORAL
COMMUNITY
Start: 2019-12-31 | End: 2024-01-25 | Stop reason: ALTCHOICE

## 2024-01-25 RX ORDER — ZOLPIDEM TARTRATE 10 MG/1
TABLET ORAL
COMMUNITY
Start: 2010-04-07 | End: 2024-01-25 | Stop reason: ALTCHOICE

## 2024-01-25 RX ORDER — TADALAFIL 5 MG/1
TABLET ORAL
COMMUNITY
Start: 2021-02-18 | End: 2024-01-25 | Stop reason: ALTCHOICE

## 2024-01-25 RX ORDER — NAPROXEN SODIUM 220 MG/1
TABLET ORAL
COMMUNITY
Start: 2020-09-10 | End: 2024-01-25 | Stop reason: ALTCHOICE

## 2024-01-25 RX ORDER — CLOPIDOGREL BISULFATE 75 MG/1
TABLET ORAL
COMMUNITY
Start: 2017-05-25 | End: 2024-01-25 | Stop reason: ALTCHOICE

## 2024-01-25 RX ORDER — GABAPENTIN 100 MG/1
CAPSULE ORAL
COMMUNITY
Start: 2020-03-24 | End: 2024-01-25 | Stop reason: ALTCHOICE

## 2024-01-25 RX ORDER — EZETIMIBE 10 MG/1
TABLET ORAL
COMMUNITY
Start: 2021-06-28 | End: 2024-01-25 | Stop reason: ALTCHOICE

## 2024-01-25 RX ORDER — LANOLIN ALCOHOL/MO/W.PET/CERES
CREAM (GRAM) TOPICAL EVERY 24 HOURS
COMMUNITY
End: 2024-01-25 | Stop reason: ALTCHOICE

## 2024-01-25 RX ORDER — ZINC GLUCONATE 50 MG
TABLET ORAL EVERY 24 HOURS
COMMUNITY
End: 2024-01-25 | Stop reason: ALTCHOICE

## 2024-01-25 RX ORDER — ZOLPIDEM TARTRATE 5 MG/1
TABLET ORAL
COMMUNITY
Start: 2010-04-07 | End: 2024-03-12 | Stop reason: ALTCHOICE

## 2024-01-25 RX ORDER — MELOXICAM 15 MG/1
TABLET ORAL EVERY 24 HOURS
COMMUNITY
Start: 2023-05-02 | End: 2024-01-25 | Stop reason: ALTCHOICE

## 2024-01-25 RX ADMIN — PERFLUTREN 2.5 ML OF DILUTION: 6.52 INJECTION, SUSPENSION INTRAVENOUS at 13:28

## 2024-01-25 NOTE — PROGRESS NOTES
Primary Care Physician: Julienne Wilhelm MD  Primary Cardiologist:       Date of Visit: 01/25/2024 11:40 AM EST  Location of visit:  [facility]   Type of Visit: Follow up             Chief Complaint   Patient presents with    Atrial Fibrillation     Here A-fib, having some pressure in his chest, burping seemed to release the pressure       HPI / Summary:   Talon Leone is a 68 y.o. male  with who returns for complaints of palpitations and abnormal hearth rhythm.     Episodes started about 3 weeks ago. He will have a discomfort/pressure like sensation in his chest and feeling an irregular heart beat and palpitations. Episodes last 5-10 minutes and as little as three minutes. Describes it as a pounding sensation. Denies associated SOB or dizziness. Feels some improvement/relief with belching. Episodes have occurred at night and woken him from sleep as well. Of note he has been having hot flashes at night at times as well. He has a history of LIZ but does not use machine at night.   Fluid intake: very little water, couple glasses per week, 2 cups of coffee per day and beer 6 per week.  Denies acid reflux symptoms. No chest pain/pressure or SOB any other time, with activity, feels good.     12 system review is negative except as noted above    Medical History:   Past Medical History:   Diagnosis Date    Acute bronchitis due to other specified organisms 04/30/2014    Viral bronchitis    Arteriosclerosis of coronary artery     BPH (benign prostatic hyperplasia)     CAD (coronary artery disease)     Chondrocostal junction syndrome (tietze) 04/30/2014    Costochondritis    Chronic obstructive asthma     Chronic sinusitis, unspecified     Chronic sinusitis    Deviated nasal septum     Acquired deviated nasal septum    Epigastric abdominal tenderness 01/25/2013    Abdominal tenderness, epigastric    HLD (hyperlipidemia)     HTN (hypertension)     Nasal congestion     Nasal congestion    Nausea 09/13/2013    Nausea    OAB  (overactive bladder)     Obstructive sleep apnea (adult) (pediatric)     does not use his CPAP    Other amnesia 09/16/2013    Memory loss    Other conditions influencing health status     Acute Otitis Externa Of The Left Ear    Other conditions influencing health status     Acute Mucoid Otitis Media Of The Left Ear    Other conditions influencing health status     Tympanic Membrane Perforation Of The Left Ear    Other conditions influencing health status     Arthritis    Other hypertrophic disorders of the skin 04/12/2016    Skin tag    Otitis media, unspecified, right ear 12/11/2017    Acute right otitis media    Pain in unspecified knee 09/10/2015    Knee pain    Pain in unspecified shoulder 10/01/2015    Shoulder pain    Pathological fracture, unspecified femur, initial encounter for fracture (CMS/[facility]) 07/14/2015    Insufficiency fracture of medial femoral condyle    Personal history of other diseases of male genital organs 10/27/2016    History of acute prostatitis    Personal history of other diseases of the digestive system 10/19/2016    History of acute gastritis    Personal history of other diseases of the nervous system and sense organs 12/15/2017    History of acute otitis media    Personal history of other diseases of the nervous system and sense organs 12/11/2012    History of acute otitis externa    Personal history of other diseases of the nervous system and sense organs 12/11/2012    History of impacted cerumen    Personal history of other diseases of the respiratory system 10/21/2016    History of acute bronchitis    Personal history of other diseases of the respiratory system 01/12/2015    History of sinusitis    Personal history of other diseases of the respiratory system 02/15/2017    History of acute bronchitis    Personal history of other specified conditions 11/01/2016    History of shortness of breath    Unspecified injury of right shoulder and upper arm, initial encounter 09/29/2015    Injury  of shoulder, right       Social History:   Tobacco Use: Medium Risk (1/15/2024)    Patient History     Smoking Tobacco Use: Former     Smokeless Tobacco Use: Never     Passive Exposure: Not on file         MEDICATIONS:   Current Outpatient Medications   Medication Instructions    aspirin 81 mg, oral, Daily    cyanocobalamin, vitamin B-12, 1,000 mcg tablet, sublingual 1 tablet, sublingual, Daily    evolocumab (Repatha SureClick) 140 mg/mL injection INJECT 140 MG (1 PEN) UNDER THE SKIN EVERY 2 WEEKS.    fesoterodine 8 mg tablet extended release 24 hr 1 tablet, oral, Daily    metoprolol succinate XL (Toprol-XL) 25 mg 24 hr tablet TAKE 1 TABLET BY MOUTH ONCE DAILY-MUST FOLLOW UP IN OFFICE FOR MORE REFILLS.    nitroglycerin (NITROSTAT) 0.4 mg, sublingual, Every 5 min PRN    zolpidem (Ambien) 5 mg tablet oral         IMAGING REVIEWED:   Echocardiogram:   ECHOCARDIOGRAM 3/20/2018    PHYSICIAN INTERPRETATION:  Left Ventricle: The left ventricular systolic function is normal, with an estimated ejection fraction of 60-65%. The left ventricular cavity size is normal. Spectral Doppler shows an impaired relaxation pattern of left ventricular diastolic filling.  Left Atrium: The left atrium is mildly dilated.  Right Ventricle: The right ventricle is normal in size. There is normal right ventricular global systolic function.  Right Atrium: The right atrium is normal in size.  Aortic Valve: The aortic valve appears structurally normal. There is no evidence of aortic valve regurgitation. The peak instantaneous gradient of the aortic valve is 4.8 mmHg.  Mitral Valve: The mitral valve is normal in structure. There is trace mitral valve regurgitation.  Tricuspid Valve: The tricuspid valve is structurally normal. There is mild tricuspid regurgitation.  Pulmonic Valve: The pulmonic valve is structurally normal. There is physiologic pulmonic valve regurgitation.  Pericardium: There is no pericardial effusion noted.  Aorta: The aortic root  is normal.        CONCLUSIONS:   1. The left ventricular systolic function is normal with a 60-65% estimated ejection fraction.   2. Spectral Doppler shows an impaired relaxation pattern of left ventricular diastolic filling.    ECHOCARDIOGRAM 1/25/2024    PHYSICIAN INTERPRETATION:  Left Ventricle: The left ventricular systolic function is normal, with an estimated ejection fraction of 60-65%. There are no regional wall motion abnormalities. The left ventricular cavity size is normal. Spectral Doppler shows an impaired relaxation pattern of left ventricular diastolic filling.  Left Atrium: The left atrium is normal in size.  Right Ventricle: The right ventricle is normal in size. There is normal right ventricular global systolic function.  Right Atrium: The right atrium is normal in size.  Aortic Valve: The aortic valve is trileaflet. There is no evidence of aortic valve stenosis.  There is no evidence of aortic valve regurgitation. The peak instantaneous gradient of the aortic valve is 5.1 mmHg.  Mitral Valve: The mitral valve is mildly thickened. There is mild mitral valve regurgitation.  Tricuspid Valve: The tricuspid valve is structurally normal. There is trace tricuspid regurgitation. The Doppler estimated RVSP is within normal limits at 21.5 mmHg.  Pulmonic Valve: The pulmonic valve is structurally normal. There is moderate pulmonic valve regurgitation.  Pericardium: There is no pericardial effusion noted.  Aorta: The aortic root is normal. The aortic root is at the upper limits of normal size.  Systemic Veins: The inferior vena cava appears to be of normal size. There is IVC inspiratory collapse greater than 50%.  In comparison to the previous echocardiogram(s): Compared with study from 3/20/2018,. Worsening pulmonary regurgitation.        CONCLUSIONS:   1. Left ventricular systolic function is normal with a 60-65% estimated ejection fraction.   2. Spectral Doppler shows an impaired relaxation pattern of left  ventricular diastolic filling.   3. RVSP within normal limits.   4. There is moderate pulmonic valve regurgitation.    Stress Testing: No results found for this or any previous visit from the past 1825 days.    Cardiac Catheterization: No results found for this or any previous visit from the past 1825 days.    Cardiac Scoring: No results found for this or any previous visit from the past 1825 days.    AAA :   AAA Screen     Narrative  [place] [id number] [place] [address]  Tel [phone] and Fax [phone]      Vascular Lab Report  Abdominal Aorta Iliac Ultrasound/IVC Ultrasound      Patient Name:     EARL Green Physician:   [id number] Missy Fonseca MD, OSMANI  Study Date:       8/3/2022     Referring Physician: [id number] DAVIS  MRN/PID:          [id number]     PCP:  Accession/Order#: [id number] CC Report to:  YOB: 1955    Technologist:        Kathya Contreras RDMS, RVT  Gender:           M            Technologist 2:  Admission Status: Outpatient   Location Performed:  [facility]      Diagnosis/ICD: Z13.6-Encounter for screening for cardiovascular disorders (AAA)  Procedure/CPT: 37839 Ultrasound, abdominal aorta, real time with image  documentation, screening study for (AAA)-[id number]      Patient History Hyperlipidemia. Pt states quit smoking in 2001. Hx of cardiac  stent. Pt complains of midline abdominal 'bulging' x 1 week.  Smoker:         Former, 2.5 pack/day.      CONCLUSIONS:  Aorta/Common Iliac Arteries/IVC: The abdominal aorta and bilateral common iliac arteries demonstrate no evidence of aneurysm.  Comparison:  Compared with study from 11/19/2019, no significant change.  Additional Findings:    Imaging & Doppler Findings:    AORTA   AP  Mid  2.30 cm      [id number] Missy Fonseca MD, OSMANI  Electronically signed by [id number] OSMANI Huerta MD on 8/5/2022 at 12:08:06 PM    OTHER: No results found for this or any previous visit from the past 1825  "days.          LABS:  CBC with Differential:    Lab Results   Component Value Date    WBC 6.4 11/07/2023    RBC 5.05 11/07/2023    HGB 15.0 11/07/2023    HCT 44.9 11/07/2023     11/07/2023    MCV 89 11/07/2023    MCH 29.7 11/07/2023    MCHC 33.4 11/07/2023    RDW 12.1 11/07/2023    NRBC 0.0 11/07/2023    LYMPHOPCT 35.2 04/04/2023    MONOPCT 8.0 04/04/2023    EOSPCT 3.3 04/04/2023    BASOPCT 0.7 04/04/2023    MONOSABS 0.49 04/04/2023    LYMPHSABS 2.15 04/04/2023    EOSABS 0.20 04/04/2023    BASOSABS 0.04 04/04/2023     CMP:    Lab Results   Component Value Date     11/07/2023    K 4.2 11/07/2023     11/07/2023    CO2 28 11/07/2023    BUN 13 11/07/2023    CREATININE 0.77 11/07/2023    GLUCOSE 96 11/07/2023    PROT 6.5 04/04/2023    CALCIUM 8.6 11/07/2023    BILITOT 0.8 04/04/2023    ALKPHOS 96 04/04/2023    AST 29 04/04/2023    ALT 45 04/04/2023     BMP:    Lab Results   Component Value Date     11/07/2023    K 4.2 11/07/2023     11/07/2023    CO2 28 11/07/2023    BUN 13 11/07/2023    CREATININE 0.77 11/07/2023    CALCIUM 8.6 11/07/2023    GLUCOSE 96 11/07/2023     Magnesium:  Lab Results   Component Value Date    MG 2.13 04/04/2023     Troponin:  No results found for: \"TROPHS\"  BNP: No results found for: \"BNP\"      Lipid Panel:  Lab Results   Component Value Date    HDL 46.0 08/03/2022    CHHDL 2.5 08/03/2022    VLDL 17 08/03/2022    TRIG 83 08/03/2022        Lab work and imaging results independently reviewed by me     Visit Vitals  /82   Pulse 70   Ht 1.727 m (5' 8\")   Wt 87.1 kg (192 lb)   SpO2 98%   BMI 29.19 kg/m²   Smoking Status Former   BSA 2.04 m²         ECG dated 1/15/2024 independently reviewed   SR with no changes      Vitals reviewed.   Constitutional:       General: Awake.      Appearance: Normal and healthy appearance. Well-developed and not in distress.   Eyes:      General: Lids are normal.      Pupils: Pupils are equal, round, and reactive to light.   HENT:      " Nose: Nose normal.    Mouth/Throat:      Lips: Pink.      Mouth: Mucous membranes are moist.   Neck:      Vascular: JVD normal.   Pulmonary:      Effort: Pulmonary effort is normal.      Breath sounds: Normal breath sounds and air entry.   Chest:      Chest wall: Not tender to palpatation.   Cardiovascular:      PMI at left midclavicular line. Normal rate. Regular rhythm. Normal S1. Normal S2.       Murmurs: There is no murmur.   Edema:     Peripheral edema absent.   Abdominal:      General: Bowel sounds are normal.      Palpations: Abdomen is soft.      Tenderness: There is no abdominal tenderness.   Musculoskeletal: Normal range of motion.      Cervical back: Full passive range of motion without pain, normal range of motion and neck supple. Skin:     General: Skin is warm and dry.   Neurological:      General: No focal deficit present.      Mental Status: Alert, oriented to person, place, and time and oriented to person, place and time. Mental status is at baseline.   Psychiatric:         Attention and Perception: Attention and perception normal.         Mood and Affect: Mood and affect normal.         Speech: Speech normal.         Behavior: Behavior normal. Behavior is cooperative.         Thought Content: Thought content normal.           Problem List Items Addressed This Visit             ICD-10-CM    Coronary artery disease involving native coronary artery of native heart without angina pectoris I25.10    Relevant Orders    Transthoracic Echo (TTE) Complete    Chest pressure R07.89    Relevant Orders    Transthoracic Echo (TTE) Complete    Holter Or Event Cardiac Monitor    Hypertension I10    Palpitations - Primary R00.2    Relevant Orders    Transthoracic Echo (TTE) Complete    Holter Or Event Cardiac Monitor    Basic metabolic panel    Magnesium    TSH with reflex to Free T4 if abnormal    Hyperlipidemia E78.5    LIZ (obstructive sleep apnea) G47.33    S/P PTCA (percutaneous transluminal coronary  angioplasty) Z98.61     Euvolemic on exam.   Concern for symptomatic palpitations and increasing frequency. Increased risk for development of atrial fibrillation given history  Recommend Holter monitor x14 days to rule out arrhythmia as well as Echocardiogram to assess LVEF and structural heart  Lab work today: BMP, MAG, TSH with reflex to T4  No medication changes at this time, continue all as prescribed  Discussed importance of daily activity and dietary modification  Follow up after testing for results   Encouraged to call with questions or concerns.    ADDENDUM:  Echocardiogram showed worsening pulmonic valve regurgitation  Per recommendation CMR is warranted for further evaluation and to quantify severity  Will arrange  Follow up as previously arranged for results of imaging and monitor  Discussed with Dr. Corin Henning    01/25/24 at 12:23 PM - EDUARDA Land      Orders:  Orders Placed This Encounter   Procedures    Basic metabolic panel    Magnesium    TSH with reflex to Free T4 if abnormal    Holter Or Event Cardiac Monitor    Transthoracic Echo (TTE) Complete         Followup Appts:  Future Appointments   Date Time Provider Department Center   1/25/2024  1:00 PM GEN ECHO [address]   1/25/2024  2:00 PM [facility] [place]    4/9/2024  8:30 AM Julienne Wilhelm MD [id number] [facility]   6/18/2024  9:40 AM EDUARDA Mc[place]an [id number] [place]   6/20/2024  8:20 AM Talon Suh MD [id number] [facility]

## 2024-02-09 ENCOUNTER — TELEPHONE (OUTPATIENT)
Dept: HEMATOLOGY/ONCOLOGY | Facility: HOSPITAL | Age: 69
End: 2024-02-09
Payer: COMMERCIAL

## 2024-02-09 DIAGNOSIS — E78.5 HYPERLIPIDEMIA, UNSPECIFIED HYPERLIPIDEMIA TYPE: ICD-10-CM

## 2024-02-09 DIAGNOSIS — E78.5 DYSLIPIDEMIA: Primary | ICD-10-CM

## 2024-02-09 RX ORDER — EPINEPHRINE 0.3 MG/.3ML
0.3 INJECTION SUBCUTANEOUS EVERY 5 MIN PRN
Status: CANCELLED | OUTPATIENT
Start: 2024-02-09

## 2024-02-09 RX ORDER — DIPHENHYDRAMINE HYDROCHLORIDE 50 MG/ML
50 INJECTION INTRAMUSCULAR; INTRAVENOUS AS NEEDED
Status: CANCELLED | OUTPATIENT
Start: 2024-02-09

## 2024-02-09 RX ORDER — FAMOTIDINE 10 MG/ML
20 INJECTION INTRAVENOUS ONCE AS NEEDED
Status: CANCELLED | OUTPATIENT
Start: 2024-02-09

## 2024-02-09 RX ORDER — ALBUTEROL SULFATE 0.83 MG/ML
3 SOLUTION RESPIRATORY (INHALATION) AS NEEDED
Status: CANCELLED | OUTPATIENT
Start: 2024-02-09

## 2024-02-09 NOTE — TELEPHONE ENCOUNTER
Received Leqvio order from EDUARDA Juarez. Patient has Medicare Part A and B. Pre-cert NPN. Patient gets initial doses 3 months apart. After initial doses, Leqvio is given 6 months apart. Reached out to patient to schedule injection. Patient had concerns about approval from insurance. Informed patient since he has Medicare Part A and B with a supplemental insurance, pre-cert is not required. Patient states he would like to reach out to his insurances to confirm coverage prior to scheduling. Emailed patient name of drug and provided him with call back number. Informed patient to call our office if he would like to proceed with scheduling. Appointment request deferred. No future appointments scheduled.

## 2024-02-12 ENCOUNTER — TELEPHONE (OUTPATIENT)
Dept: HEMATOLOGY/ONCOLOGY | Facility: HOSPITAL | Age: 69
End: 2024-02-12
Payer: COMMERCIAL

## 2024-02-12 ENCOUNTER — INFUSION (OUTPATIENT)
Dept: HEMATOLOGY/ONCOLOGY | Facility: HOSPITAL | Age: 69
End: 2024-02-12
Payer: MEDICARE

## 2024-02-12 DIAGNOSIS — E78.5 DYSLIPIDEMIA: ICD-10-CM

## 2024-02-12 DIAGNOSIS — E78.5 HYPERLIPIDEMIA, UNSPECIFIED HYPERLIPIDEMIA TYPE: ICD-10-CM

## 2024-02-12 RX ORDER — EPINEPHRINE 0.3 MG/.3ML
0.3 INJECTION SUBCUTANEOUS EVERY 5 MIN PRN
OUTPATIENT
Start: 2024-05-12

## 2024-02-12 RX ORDER — ALBUTEROL SULFATE 0.83 MG/ML
3 SOLUTION RESPIRATORY (INHALATION) AS NEEDED
OUTPATIENT
Start: 2024-05-12

## 2024-02-12 RX ORDER — DIPHENHYDRAMINE HYDROCHLORIDE 50 MG/ML
50 INJECTION INTRAMUSCULAR; INTRAVENOUS AS NEEDED
OUTPATIENT
Start: 2024-05-12

## 2024-02-12 RX ORDER — FAMOTIDINE 10 MG/ML
20 INJECTION INTRAVENOUS ONCE AS NEEDED
OUTPATIENT
Start: 2024-05-12

## 2024-02-12 NOTE — TELEPHONE ENCOUNTER
Pt aware to only be taking one of his cholesterol injections. Renata Husain CNP, states we will reschedule his Leqvio injection to start after he is finished with his Repatha injections. Pt states understanding. Pt states he will call back to schedule yara.

## 2024-02-23 ENCOUNTER — HOSPITAL ENCOUNTER (OUTPATIENT)
Dept: RADIOLOGY | Facility: HOSPITAL | Age: 69
Discharge: HOME | End: 2024-02-23
Payer: MEDICARE

## 2024-02-23 VITALS — BODY MASS INDEX: 29.07 KG/M2 | HEIGHT: 68 IN | WEIGHT: 191.8 LBS

## 2024-02-23 DIAGNOSIS — I37.1 MODERATE PULMONARY VALVE REGURGITATION: ICD-10-CM

## 2024-02-23 DIAGNOSIS — I25.10 CORONARY ARTERY DISEASE INVOLVING NATIVE CORONARY ARTERY OF NATIVE HEART WITHOUT ANGINA PECTORIS: ICD-10-CM

## 2024-02-23 DIAGNOSIS — E78.2 MIXED HYPERLIPIDEMIA: ICD-10-CM

## 2024-02-23 DIAGNOSIS — Z98.61 S/P PTCA (PERCUTANEOUS TRANSLUMINAL CORONARY ANGIOPLASTY): ICD-10-CM

## 2024-02-23 PROCEDURE — 2550000001 HC RX 255 CONTRASTS: Mod: MUE | Performed by: NURSE PRACTITIONER

## 2024-02-23 PROCEDURE — A9575 INJ GADOTERATE MEGLUMI 0.1ML: HCPCS | Mod: MUE | Performed by: NURSE PRACTITIONER

## 2024-02-23 PROCEDURE — 75561 CARDIAC MRI FOR MORPH W/DYE: CPT | Performed by: RADIOLOGY

## 2024-02-23 PROCEDURE — 75565 CARD MRI VELOC FLOW MAPPING: CPT

## 2024-02-23 PROCEDURE — 71555 MRI ANGIO CHEST W OR W/O DYE: CPT | Performed by: RADIOLOGY

## 2024-02-23 RX ORDER — GADOTERATE MEGLUMINE 376.9 MG/ML
35 INJECTION INTRAVENOUS
Status: COMPLETED | OUTPATIENT
Start: 2024-02-23 | End: 2024-02-23

## 2024-02-23 RX ADMIN — GADOTERATE MEGLUMINE 35 ML: 376.9 INJECTION INTRAVENOUS at 09:16

## 2024-03-05 LAB — BODY SURFACE AREA: 2.04 M2

## 2024-03-07 ENCOUNTER — LAB (OUTPATIENT)
Dept: LAB | Facility: LAB | Age: 69
End: 2024-03-07
Payer: MEDICARE

## 2024-03-07 DIAGNOSIS — E78.5 HYPERLIPIDEMIA, UNSPECIFIED HYPERLIPIDEMIA TYPE: ICD-10-CM

## 2024-03-07 DIAGNOSIS — E78.5 DYSLIPIDEMIA: ICD-10-CM

## 2024-03-07 LAB
CHOLEST SERPL-MCNC: 141 MG/DL (ref 0–199)
CHOLESTEROL/HDL RATIO: 2.7
HDLC SERPL-MCNC: 52.8 MG/DL
LDLC SERPL CALC-MCNC: 64 MG/DL
NON HDL CHOLESTEROL: 88 MG/DL (ref 0–149)
TRIGL SERPL-MCNC: 120 MG/DL (ref 0–149)
VLDL: 24 MG/DL (ref 0–40)

## 2024-03-07 PROCEDURE — 36415 COLL VENOUS BLD VENIPUNCTURE: CPT

## 2024-03-07 PROCEDURE — 80061 LIPID PANEL: CPT

## 2024-03-12 ENCOUNTER — OFFICE VISIT (OUTPATIENT)
Dept: CARDIOLOGY | Facility: CLINIC | Age: 69
End: 2024-03-12
Payer: MEDICARE

## 2024-03-12 VITALS
HEIGHT: 68 IN | HEART RATE: 68 BPM | SYSTOLIC BLOOD PRESSURE: 115 MMHG | WEIGHT: 200 LBS | OXYGEN SATURATION: 98 % | DIASTOLIC BLOOD PRESSURE: 75 MMHG | BODY MASS INDEX: 30.31 KG/M2

## 2024-03-12 DIAGNOSIS — I10 PRIMARY HYPERTENSION: ICD-10-CM

## 2024-03-12 DIAGNOSIS — Z98.61 S/P PTCA (PERCUTANEOUS TRANSLUMINAL CORONARY ANGIOPLASTY): ICD-10-CM

## 2024-03-12 DIAGNOSIS — E78.2 MIXED HYPERLIPIDEMIA: ICD-10-CM

## 2024-03-12 DIAGNOSIS — R00.2 PALPITATIONS: Primary | ICD-10-CM

## 2024-03-12 DIAGNOSIS — I47.10 PAROXYSMAL SUPRAVENTRICULAR TACHYCARDIA (CMS-HCC): ICD-10-CM

## 2024-03-12 DIAGNOSIS — I25.10 CORONARY ARTERY DISEASE INVOLVING NATIVE CORONARY ARTERY OF NATIVE HEART WITHOUT ANGINA PECTORIS: ICD-10-CM

## 2024-03-12 PROCEDURE — 1036F TOBACCO NON-USER: CPT | Performed by: NURSE PRACTITIONER

## 2024-03-12 PROCEDURE — 1125F AMNT PAIN NOTED PAIN PRSNT: CPT | Performed by: NURSE PRACTITIONER

## 2024-03-12 PROCEDURE — 3078F DIAST BP <80 MM HG: CPT | Performed by: NURSE PRACTITIONER

## 2024-03-12 PROCEDURE — 1159F MED LIST DOCD IN RCRD: CPT | Performed by: NURSE PRACTITIONER

## 2024-03-12 PROCEDURE — 3074F SYST BP LT 130 MM HG: CPT | Performed by: NURSE PRACTITIONER

## 2024-03-12 PROCEDURE — 99214 OFFICE O/P EST MOD 30 MIN: CPT | Performed by: NURSE PRACTITIONER

## 2024-03-12 PROCEDURE — 1160F RVW MEDS BY RX/DR IN RCRD: CPT | Performed by: NURSE PRACTITIONER

## 2024-03-12 RX ORDER — METOPROLOL SUCCINATE 50 MG/1
50 TABLET, EXTENDED RELEASE ORAL DAILY
Qty: 90 TABLET | Refills: 3 | Status: SHIPPED | OUTPATIENT
Start: 2024-03-12 | End: 2024-04-10 | Stop reason: ALTCHOICE

## 2024-03-12 NOTE — PROGRESS NOTES
Primary Care Physician: Julienne Wilhelm MD  Primary Cardiologist:       Date of Visit: 03/12/2024  9:00 AM EDT  Location of visit:  [facility]   Type of Visit: Follow up             Chief Complaint   Patient presents with    Follow-up     Here for his test results       HPI / Summary:   Talon [place] is a 68 y.o. male   known to Dr. Ramirez with CAD Cath for angina 5/16/2017: Codominant system 3VD S/p Culprit vessel PCI to LAD/D1. Preserved LV systolic function. Negative stress test at 12 METs (3/26/2018). Hyperlipidemia. Statin intolerance. Ex smoker. Obesity. LIZ returns for routine follow up     Extended Holter shows palpitations associated with SVT, longest episode lasting ~ 2.5 hours.  He says episodes are infrequent,  but palpitations often wake him up during the night.  He has a remote dx of LIZ but unable to tolerate CPAP   Cardiac MRI ruled out pulmonic regurgitation as noted on echo. There is prolapse of the anterior mitral valve leaflet.    LVEF is preserved 60-65%          12 system review of symptoms is negative except as noted above         Medical History:   Past Medical History:   Diagnosis Date    Acute bronchitis due to other specified organisms 04/30/2014    Viral bronchitis    Arteriosclerosis of coronary artery     BPH (benign prostatic hyperplasia)     CAD (coronary artery disease)     Chondrocostal junction syndrome (tietze) 04/30/2014    Costochondritis    Chronic obstructive asthma     Chronic sinusitis, unspecified     Chronic sinusitis    Deviated nasal septum     Acquired deviated nasal septum    Epigastric abdominal tenderness 01/25/2013    Abdominal tenderness, epigastric    HLD (hyperlipidemia)     HTN (hypertension)     Nasal congestion     Nasal congestion    Nausea 09/13/2013    Nausea    OAB (overactive bladder)     Obstructive sleep apnea (adult) (pediatric)     does not use his CPAP    Other amnesia 09/16/2013    Memory loss    Other conditions influencing health status      Acute Otitis Externa Of The Left Ear    Other conditions influencing health status     Acute Mucoid Otitis Media Of The Left Ear    Other conditions influencing health status     Tympanic Membrane Perforation Of The Left Ear    Other conditions influencing health status     Arthritis    Other hypertrophic disorders of the skin 04/12/2016    Skin tag    Otitis media, unspecified, right ear 12/11/2017    Acute right otitis media    Pain in unspecified knee 09/10/2015    Knee pain    Pain in unspecified shoulder 10/01/2015    Shoulder pain    Pathological fracture, unspecified femur, initial encounter for fracture (CMS/[facility]) 07/14/2015    Insufficiency fracture of medial femoral condyle    Personal history of other diseases of male genital organs 10/27/2016    History of acute prostatitis    Personal history of other diseases of the digestive system 10/19/2016    History of acute gastritis    Personal history of other diseases of the nervous system and sense organs 12/15/2017    History of acute otitis media    Personal history of other diseases of the nervous system and sense organs 12/11/2012    History of acute otitis externa    Personal history of other diseases of the nervous system and sense organs 12/11/2012    History of impacted cerumen    Personal history of other diseases of the respiratory system 10/21/2016    History of acute bronchitis    Personal history of other diseases of the respiratory system 01/12/2015    History of sinusitis    Personal history of other diseases of the respiratory system 02/15/2017    History of acute bronchitis    Personal history of other specified conditions 11/01/2016    History of shortness of breath    Unspecified injury of right shoulder and upper arm, initial encounter 09/29/2015    Injury of shoulder, right       Social History:   Tobacco Use: Medium Risk (3/12/2024)    Patient History     Smoking Tobacco Use: Former     Smokeless Tobacco Use: Never     Passive Exposure:  Not on file         MEDICATIONS:   Current Outpatient Medications   Medication Instructions    aspirin 81 mg, oral, Daily    cyanocobalamin, vitamin B-12, 1,000 mcg tablet, sublingual 1 tablet, sublingual, Daily    evolocumab (Repatha SureClick) 140 mg/mL injection INJECT 140 MG (1 PEN) UNDER THE SKIN EVERY 2 WEEKS.    fesoterodine 8 mg tablet extended release 24 hr 1 tablet, oral, Daily    metoprolol succinate XL (TOPROL-XL) 50 mg, oral, Daily, Do not crush or chew.    nitroglycerin (NITROSTAT) 0.4 mg, sublingual, Every 5 min PRN         IMAGING REVIEWED:   MR cardiac angio chest w/wo IV contrast 2/23/2024  IMPRESSION:  1. The left ventricle is normal in size with low-normal systolic  function (LVEF = 50%). There are no segmental wall motion  abnormalities.  Quantitative values are as noted above.  2. The right ventricle is normal in size with low-normal systolic  function (RVEF = 46%).  3. No evidence of significant pulmonic valve regurgitation.  4. There are no findings to suggest prior ischemic damage or an  infiltrative process.  5. Findings suggestive of prolapse of the anterior mitral valve  leaflet    ECHOCARDIOGRAM 3/20/2018  CONCLUSIONS:   1. The left ventricular systolic function is normal with a 60-65% estimated ejection fraction.   2. Spectral Doppler shows an impaired relaxation pattern of left ventricular diastolic filling.    ECHOCARDIOGRAM 1/25/2024  CONCLUSIONS:   1. Left ventricular systolic function is normal with a 60-65% estimated ejection fraction.   2. Spectral Doppler shows an impaired relaxation pattern of left ventricular diastolic filling.   3. RVSP within normal limits.   4. There is moderate pulmonic valve regurgitation.          AAA Screen   Narrative   Vascular Lab Report  Abdominal Aorta Iliac Ultrasound/IVC Ultrasound   CONCLUSIONS:  Aorta/Common Iliac Arteries/IVC: The abdominal aorta and bilateral common iliac arteries demonstrate no evidence of aneurysm.  Comparison:  Compared  "with study from 11/19/2019, no significant change.  Additional Findings:    Imaging & Doppler Findings:    AORTA   AP  Mid  2.30 cm  [id number] Missy Fonseca MD, RPVI  Electronically signed by [id number] Missy Fonseca MD, RPVI on 8/5/2022 at 12:08:06 PM               LABS:  CBC with Differential:    Lab Results   Component Value Date    WBC 6.4 11/07/2023    RBC 5.05 11/07/2023    HGB 15.0 11/07/2023    HCT 44.9 11/07/2023     11/07/2023    MCV 89 11/07/2023    MCH 29.7 11/07/2023    MCHC 33.4 11/07/2023    RDW 12.1 11/07/2023    NRBC 0.0 11/07/2023    LYMPHOPCT 35.2 04/04/2023    MONOPCT 8.0 04/04/2023    EOSPCT 3.3 04/04/2023    BASOPCT 0.7 04/04/2023    MONOSABS 0.49 04/04/2023    LYMPHSABS 2.15 04/04/2023    EOSABS 0.20 04/04/2023    BASOSABS 0.04 04/04/2023     CMP:    Lab Results   Component Value Date     01/25/2024    K 4.4 01/25/2024     01/25/2024    CO2 26 01/25/2024    BUN 15 01/25/2024    CREATININE 0.92 01/25/2024    GLUCOSE 137 (H) 01/25/2024    PROT 6.5 04/04/2023    CALCIUM 9.5 01/25/2024    BILITOT 0.8 04/04/2023    ALKPHOS 96 04/04/2023    AST 29 04/04/2023    ALT 45 04/04/2023     BMP:    Lab Results   Component Value Date     01/25/2024    K 4.4 01/25/2024     01/25/2024    CO2 26 01/25/2024    BUN 15 01/25/2024    CREATININE 0.92 01/25/2024    CALCIUM 9.5 01/25/2024    GLUCOSE 137 (H) 01/25/2024     Magnesium:  Lab Results   Component Value Date    MG 2.21 01/25/2024     Troponin:  No results found for: \"TROPHS\"  BNP: No results found for: \"BNP\"      Lipid Panel:  Lab Results   Component Value Date    HDL 52.8 03/07/2024    CHHDL 2.7 03/07/2024    VLDL 24 03/07/2024    TRIG 120 03/07/2024    NHDL 88 03/07/2024        Lab work and imaging results independently reviewed by me     Visit Vitals  /75   Pulse 68   Ht 1.727 m (5' 8\")   Wt 90.7 kg (200 lb)   SpO2 98%   BMI 30.41 kg/m²   Smoking Status Former   BSA 2.09 m²          Vitals reviewed.   Constitutional:       General: " Awake.      Appearance: Normal and healthy appearance. Well-developed and not in distress.   Eyes:      General: Lids are normal.      Conjunctiva/sclera: Conjunctivae normal.      Pupils: Pupils are equal, round, and reactive to light.   HENT:      Nose: Nose normal.    Mouth/Throat:      Lips: Pink.      Mouth: Mucous membranes are moist.   Neck:      Vascular: JVD normal.   Pulmonary:      Effort: Pulmonary effort is normal.      Breath sounds: Normal breath sounds and air entry.   Chest:      Chest wall: Not tender to palpatation.   Cardiovascular:      PMI at left midclavicular line. Normal rate. Regular rhythm. Normal S1. Normal S2.       Murmurs: There is no murmur.      No rub.   Pulses:     Intact distal pulses.   Edema:     Peripheral edema absent.   Abdominal:      General: Bowel sounds are normal.      Palpations: Abdomen is soft.      Tenderness: There is no abdominal tenderness.   Musculoskeletal: Normal range of motion.      Cervical back: Full passive range of motion without pain, normal range of motion and neck supple. Skin:     General: Skin is warm and dry.   Neurological:      General: No focal deficit present.      Mental Status: Alert, oriented to person, place, and time and oriented to person, place and time. Mental status is at baseline.   Psychiatric:         Attention and Perception: Attention and perception normal.         Mood and Affect: Mood and affect normal.         Speech: Speech normal.         Behavior: Behavior normal. Behavior is cooperative.         Thought Content: Thought content normal.           Problem List Items Addressed This Visit             ICD-10-CM    Coronary artery disease involving native coronary artery of native heart without angina pectoris I25.10    Relevant Medications    metoprolol succinate XL (Toprol-XL) 50 mg 24 hr tablet    Hyperlipidemia E78.5    S/P PTCA (percutaneous transluminal coronary angioplasty) Z98.61    Relevant Medications    metoprolol  succinate XL (Toprol-XL) 50 mg 24 hr tablet    Hypertension I10    Palpitations - Primary R00.2    Paroxysmal supraventricular tachycardia I47.10    Relevant Medications    metoprolol succinate XL (Toprol-XL) 50 mg 24 hr tablet     Increase Toprol XL to 50 mg daily   Consider treating LIZ before discussion of EPS / RFA     Most recent LDL 64 mg/dL on Repatha   -- He Is starting treatment with Inclisiran         03/12/24 at 9:57 AM - EDUARDA Mcgowan      Orders:  No orders of the defined types were placed in this encounter.        Followup Appts:  Future Appointments   Date Time Provider Department Center   4/9/2024  8:30 AM Julienne Wilhelm MD [id number] [facility] [place]   6/18/2024  9:40 AM EDUARDA Mcgowan [id number] [facility]   6/20/2024  8:20 AM Talon Suh MD [id number] [facility] [place]   3/14/2025  9:00 AM EDUARDA Mcgowan [id number] [facility]

## 2024-03-20 ENCOUNTER — TELEPHONE (OUTPATIENT)
Dept: HEMATOLOGY/ONCOLOGY | Facility: HOSPITAL | Age: 69
End: 2024-03-20
Payer: COMMERCIAL

## 2024-03-20 NOTE — TELEPHONE ENCOUNTER
Reached out to patient regarding his appointment request for his Inclisiran Injection. Left message with call back number for patient to call back at his earliest convenience.

## 2024-03-25 NOTE — TELEPHONE ENCOUNTER
Reached out to patient regarding the active request for him to get Inclisiran Injections. Patient states he takes his last Repatha injection in two weeks then he will give us a call to schedule his new injections.

## 2024-03-28 ENCOUNTER — SPECIALTY PHARMACY (OUTPATIENT)
Dept: PHARMACY | Facility: CLINIC | Age: 69
End: 2024-03-28

## 2024-04-03 ENCOUNTER — LAB (OUTPATIENT)
Dept: LAB | Facility: LAB | Age: 69
End: 2024-04-03
Payer: MEDICARE

## 2024-04-03 DIAGNOSIS — E55.9 VITAMIN D DEFICIENCY, UNSPECIFIED: ICD-10-CM

## 2024-04-03 DIAGNOSIS — E78.00 HYPERCHOLESTEREMIA: ICD-10-CM

## 2024-04-03 DIAGNOSIS — E53.8 VITAMIN B12 DEFICIENCY: ICD-10-CM

## 2024-04-03 DIAGNOSIS — I10 HYPERTENSION, UNSPECIFIED TYPE: ICD-10-CM

## 2024-04-03 DIAGNOSIS — Z79.899 HIGH RISK MEDICATION USE: ICD-10-CM

## 2024-04-03 DIAGNOSIS — Z12.5 SCREENING FOR PROSTATE CANCER: ICD-10-CM

## 2024-04-03 DIAGNOSIS — R53.83 OTHER FATIGUE: ICD-10-CM

## 2024-04-03 LAB
25(OH)D3 SERPL-MCNC: 37 NG/ML (ref 30–100)
ALBUMIN SERPL BCP-MCNC: 4.3 G/DL (ref 3.4–5)
ALP SERPL-CCNC: 82 U/L (ref 33–136)
ALT SERPL W P-5'-P-CCNC: 13 U/L (ref 10–52)
ANION GAP SERPL CALC-SCNC: 12 MMOL/L (ref 10–20)
AST SERPL W P-5'-P-CCNC: 14 U/L (ref 9–39)
BASOPHILS # BLD AUTO: 0.04 X10*3/UL (ref 0–0.1)
BASOPHILS NFR BLD AUTO: 0.6 %
BILIRUB SERPL-MCNC: 0.9 MG/DL (ref 0–1.2)
BUN SERPL-MCNC: 20 MG/DL (ref 6–23)
CALCIUM SERPL-MCNC: 9.1 MG/DL (ref 8.6–10.3)
CHLORIDE SERPL-SCNC: 106 MMOL/L (ref 98–107)
CHOLEST SERPL-MCNC: 140 MG/DL (ref 0–199)
CHOLESTEROL/HDL RATIO: 2.5
CO2 SERPL-SCNC: 26 MMOL/L (ref 21–32)
CREAT SERPL-MCNC: 0.81 MG/DL (ref 0.5–1.3)
EGFRCR SERPLBLD CKD-EPI 2021: >90 ML/MIN/1.73M*2
EOSINOPHIL # BLD AUTO: 0.28 X10*3/UL (ref 0–0.7)
EOSINOPHIL NFR BLD AUTO: 4.4 %
ERYTHROCYTE [DISTWIDTH] IN BLOOD BY AUTOMATED COUNT: 12.5 % (ref 11.5–14.5)
GLUCOSE SERPL-MCNC: 99 MG/DL (ref 74–99)
HCT VFR BLD AUTO: 46 % (ref 41–52)
HDLC SERPL-MCNC: 55.6 MG/DL
HGB BLD-MCNC: 15.5 G/DL (ref 13.5–17.5)
IMM GRANULOCYTES # BLD AUTO: 0.02 X10*3/UL (ref 0–0.7)
IMM GRANULOCYTES NFR BLD AUTO: 0.3 % (ref 0–0.9)
LDLC SERPL CALC-MCNC: 68 MG/DL
LYMPHOCYTES # BLD AUTO: 2.5 X10*3/UL (ref 1.2–4.8)
LYMPHOCYTES NFR BLD AUTO: 39.4 %
MCH RBC QN AUTO: 29.6 PG (ref 26–34)
MCHC RBC AUTO-ENTMCNC: 33.7 G/DL (ref 32–36)
MCV RBC AUTO: 88 FL (ref 80–100)
MONOCYTES # BLD AUTO: 0.43 X10*3/UL (ref 0.1–1)
MONOCYTES NFR BLD AUTO: 6.8 %
NEUTROPHILS # BLD AUTO: 3.07 X10*3/UL (ref 1.2–7.7)
NEUTROPHILS NFR BLD AUTO: 48.5 %
NON HDL CHOLESTEROL: 84 MG/DL (ref 0–149)
NRBC BLD-RTO: 0 /100 WBCS (ref 0–0)
PLATELET # BLD AUTO: 207 X10*3/UL (ref 150–450)
POTASSIUM SERPL-SCNC: 3.9 MMOL/L (ref 3.5–5.3)
PROT SERPL-MCNC: 6.7 G/DL (ref 6.4–8.2)
PSA SERPL-MCNC: 1.13 NG/ML
RBC # BLD AUTO: 5.24 X10*6/UL (ref 4.5–5.9)
SODIUM SERPL-SCNC: 140 MMOL/L (ref 136–145)
TRIGL SERPL-MCNC: 84 MG/DL (ref 0–149)
TSH SERPL-ACNC: 2.58 MIU/L (ref 0.44–3.98)
VIT B12 SERPL-MCNC: 1102 PG/ML (ref 211–911)
VLDL: 17 MG/DL (ref 0–40)
WBC # BLD AUTO: 6.3 X10*3/UL (ref 4.4–11.3)

## 2024-04-03 PROCEDURE — 84443 ASSAY THYROID STIM HORMONE: CPT

## 2024-04-03 PROCEDURE — G0103 PSA SCREENING: HCPCS

## 2024-04-03 PROCEDURE — 82607 VITAMIN B-12: CPT

## 2024-04-03 PROCEDURE — 80061 LIPID PANEL: CPT

## 2024-04-03 PROCEDURE — 85025 COMPLETE CBC W/AUTO DIFF WBC: CPT

## 2024-04-03 PROCEDURE — 36415 COLL VENOUS BLD VENIPUNCTURE: CPT

## 2024-04-03 PROCEDURE — 80053 COMPREHEN METABOLIC PANEL: CPT

## 2024-04-03 PROCEDURE — 82306 VITAMIN D 25 HYDROXY: CPT

## 2024-04-09 ENCOUNTER — OFFICE VISIT (OUTPATIENT)
Dept: PRIMARY CARE | Facility: CLINIC | Age: 69
End: 2024-04-09
Payer: MEDICARE

## 2024-04-09 VITALS
TEMPERATURE: 96.7 F | DIASTOLIC BLOOD PRESSURE: 58 MMHG | BODY MASS INDEX: 30.13 KG/M2 | OXYGEN SATURATION: 96 % | HEART RATE: 60 BPM | HEIGHT: 68 IN | WEIGHT: 198.8 LBS | SYSTOLIC BLOOD PRESSURE: 108 MMHG

## 2024-04-09 DIAGNOSIS — R25.2 MUSCLE CRAMPS: Primary | ICD-10-CM

## 2024-04-09 DIAGNOSIS — Z13.89 SCREENING FOR MULTIPLE CONDITIONS: ICD-10-CM

## 2024-04-09 DIAGNOSIS — Z00.00 ROUTINE GENERAL MEDICAL EXAMINATION AT HEALTH CARE FACILITY: ICD-10-CM

## 2024-04-09 DIAGNOSIS — E78.00 HYPERCHOLESTEREMIA: ICD-10-CM

## 2024-04-09 DIAGNOSIS — R00.2 PALPITATIONS: ICD-10-CM

## 2024-04-09 DIAGNOSIS — Z98.61 S/P PTCA (PERCUTANEOUS TRANSLUMINAL CORONARY ANGIOPLASTY): ICD-10-CM

## 2024-04-09 DIAGNOSIS — I10 HYPERTENSION, UNSPECIFIED TYPE: ICD-10-CM

## 2024-04-09 PROCEDURE — 1158F ADVNC CARE PLAN TLK DOCD: CPT | Performed by: INTERNAL MEDICINE

## 2024-04-09 PROCEDURE — 1160F RVW MEDS BY RX/DR IN RCRD: CPT | Performed by: INTERNAL MEDICINE

## 2024-04-09 PROCEDURE — 1159F MED LIST DOCD IN RCRD: CPT | Performed by: INTERNAL MEDICINE

## 2024-04-09 PROCEDURE — 3078F DIAST BP <80 MM HG: CPT | Performed by: INTERNAL MEDICINE

## 2024-04-09 PROCEDURE — 1123F ACP DISCUSS/DSCN MKR DOCD: CPT | Performed by: INTERNAL MEDICINE

## 2024-04-09 PROCEDURE — 1036F TOBACCO NON-USER: CPT | Performed by: INTERNAL MEDICINE

## 2024-04-09 PROCEDURE — 3074F SYST BP LT 130 MM HG: CPT | Performed by: INTERNAL MEDICINE

## 2024-04-09 PROCEDURE — G0439 PPPS, SUBSEQ VISIT: HCPCS | Performed by: INTERNAL MEDICINE

## 2024-04-09 PROCEDURE — 1170F FXNL STATUS ASSESSED: CPT | Performed by: INTERNAL MEDICINE

## 2024-04-09 ASSESSMENT — ENCOUNTER SYMPTOMS
COUGH: 0
OCCASIONAL FEELINGS OF UNSTEADINESS: 0
DIZZINESS: 0
MYALGIAS: 1
HEADACHES: 0
PALPITATIONS: 0
DEPRESSION: 0
LOSS OF SENSATION IN FEET: 0
SORE THROAT: 0
BLOOD IN STOOL: 0
SINUS PAIN: 0
DIFFICULTY URINATING: 0
WHEEZING: 0
FEVER: 0
ABDOMINAL PAIN: 0
FATIGUE: 0
UNEXPECTED WEIGHT CHANGE: 0
ARTHRALGIAS: 0
DIARRHEA: 0
BRUISES/BLEEDS EASILY: 0

## 2024-04-09 ASSESSMENT — PATIENT HEALTH QUESTIONNAIRE - PHQ9
2. FEELING DOWN, DEPRESSED OR HOPELESS: SEVERAL DAYS
8. MOVING OR SPEAKING SO SLOWLY THAT OTHER PEOPLE COULD HAVE NOTICED. OR THE OPPOSITE, BEING SO FIGETY OR RESTLESS THAT YOU HAVE BEEN MOVING AROUND A LOT MORE THAN USUAL: NOT AT ALL
5. POOR APPETITE OR OVEREATING: NOT AT ALL
SUM OF ALL RESPONSES TO PHQ QUESTIONS 1-9: 3
9. THOUGHTS THAT YOU WOULD BE BETTER OFF DEAD, OR OF HURTING YOURSELF: NOT AT ALL
1. LITTLE INTEREST OR PLEASURE IN DOING THINGS: MORE THAN HALF THE DAYS
7. TROUBLE CONCENTRATING ON THINGS, SUCH AS READING THE NEWSPAPER OR WATCHING TELEVISION: NOT AT ALL
SUM OF ALL RESPONSES TO PHQ9 QUESTIONS 1 AND 2: 3
3. TROUBLE FALLING OR STAYING ASLEEP OR SLEEPING TOO MUCH: NOT AT ALL
10. IF YOU CHECKED OFF ANY PROBLEMS, HOW DIFFICULT HAVE THESE PROBLEMS MADE IT FOR YOU TO DO YOUR WORK, TAKE CARE OF THINGS AT HOME, OR GET ALONG WITH OTHER PEOPLE: NOT DIFFICULT AT ALL
6. FEELING BAD ABOUT YOURSELF - OR THAT YOU ARE A FAILURE OR HAVE LET YOURSELF OR YOUR FAMILY DOWN: NOT AT ALL
4. FEELING TIRED OR HAVING LITTLE ENERGY: NOT AT ALL

## 2024-04-09 ASSESSMENT — ACTIVITIES OF DAILY LIVING (ADL)
BATHING: INDEPENDENT
DOING_HOUSEWORK: INDEPENDENT
DRESSING: INDEPENDENT
TAKING_MEDICATION: INDEPENDENT
MANAGING_FINANCES: INDEPENDENT
GROCERY_SHOPPING: INDEPENDENT

## 2024-04-09 NOTE — PROGRESS NOTES
"Subjective   Patient ID: Talon [place] is a 68 y.o. male who presents for Medicare Annual Wellness Visit Subsequent, muscle weakness (In legs, comes and goes, thinks it is due to metoprolol), and Results.    HPI       Review of Systems    Objective   Lab Results   Component Value Date    HGBA1C 5.0 04/04/2023      /58   Pulse 60   Temp 35.9 °C (96.7 °F)   Ht 1.727 m (5' 8\")   Wt 90.2 kg (198 lb 12.8 oz)   SpO2 96%   BMI 30.23 kg/m²     Physical Exam    Assessment/Plan   There are no diagnoses linked to this encounter.   "

## 2024-04-09 NOTE — PROGRESS NOTES
Subjective   Reason for Visit: Talon [place] is an 68 y.o. male here for a Medicare Wellness visit.     Past Medical, Surgical, and Family History reviewed and updated in chart.    Reviewed all medications by prescribing practitioner or clinical pharmacist (such as prescriptions, OTCs, herbal therapies and supplements) and documented in the medical record.    Preventive visit  - Vaccinations reviewed and up-to-date  - Screening for colon cancer up-to-date repeat done in 2020 3 repeat in July 2028 Dr. Delcid  - Recent blood work reviewed and up-to-date  - Screening for depression negative  I spent 15 minutes obtaining and discussing depression screening using PHQ-2 questions with results documented in the chart.  Patient comes today complaining of right elbow swelling unable to hyperextend denies any falling denies any other complaints physical exam tenderness and swelling of the right elbow etiology to be determined  -Advanced directive reviewed    Follow-up  - Recent blood work reviewed  High vitamin B12 underlying neuropathy symptoms did not change discontinue vitamin B12 which patient is using now once a week  - Recurrent palpitation patient metoprolol increased recently by cardiology patient concerned about weakness muscle cramps in the lower extremity possible due to beta-blockers also fatigue and tiredness  - Patient need to go back to cardiology for further eval recommendation May need to follow-up electrophysiology for further recommendation regarding beta-blockers patient going to be scheduled another appointment with cardiology  - Patient need to avoid any NSAIDs due to underlying coronary artery disease  - - Hypercholesterolemia patient underlying- Statin intolerance continue on the newer injection Leqvic an outpatient in [place] with lower cost  - Benign prostatic hypertrophy symptoms improving now continue with current medication  - Coronary artery disease compensated continue with aspirin daily  - COPD  "compensated on Breo as needed doing well    Follow-up 3 months             Patient Care Team:  Julienne Wilhelm MD as PCP - General (Internal Medicine)  [facility] PCP [facility] Attributed Provider  Julienne Wilhelm MD     Review of Systems   Constitutional:  Negative for fatigue, fever and unexpected weight change.   HENT:  Negative for congestion, ear discharge, ear pain, mouth sores, sinus pain and sore throat.    Eyes:  Negative for visual disturbance.   Respiratory:  Negative for cough and wheezing.    Cardiovascular:  Negative for chest pain, palpitations and leg swelling.   Gastrointestinal:  Negative for abdominal pain, blood in stool and diarrhea.   Genitourinary:  Negative for difficulty urinating.   Musculoskeletal:  Positive for myalgias. Negative for arthralgias.   Skin:  Negative for rash.   Neurological:  Negative for dizziness and headaches.   Hematological:  Does not bruise/bleed easily.   Psychiatric/Behavioral:  Negative for behavioral problems.    All other systems reviewed and are negative.      Objective   Vitals:  /58   Pulse 60   Temp 35.9 °C (96.7 °F)   Ht 1.727 m (5' 8\")   Wt 90.2 kg (198 lb 12.8 oz)   SpO2 96%   BMI 30.23 kg/m²       Physical Exam  Vitals and nursing note reviewed.   Constitutional:       Appearance: Normal appearance.   HENT:      Head: Normocephalic.      Nose: Nose normal.   Eyes:      Conjunctiva/sclera: Conjunctivae normal.      Pupils: Pupils are equal, round, and reactive to light.   Cardiovascular:      Rate and Rhythm: Regular rhythm.   Pulmonary:      Effort: Pulmonary effort is normal.      Breath sounds: Normal breath sounds.   Abdominal:      General: Abdomen is flat.      Palpations: Abdomen is soft.   Musculoskeletal:      Cervical back: Neck supple.   Skin:     General: Skin is warm.   Neurological:      General: No focal deficit present.      Mental Status: He is oriented to person, place, and time.   Psychiatric:         Mood " and Affect: Mood normal.         Assessment/Plan   Problem List Items Addressed This Visit       S/P PTCA (percutaneous transluminal coronary angioplasty)    Hypertension    Palpitations    Muscle cramps - Primary     Other Visit Diagnoses       Screening for multiple conditions        Routine general medical examination at health care facility        Hypercholesteremia              Preventive visit  - Vaccinations reviewed and up-to-date  - Screening for colon cancer up-to-date repeat done in 2020 3 repeat in July 2028 Dr. Delcid  - Recent blood work reviewed and up-to-date  - Screening for depression negative  I spent 15 minutes obtaining and discussing depression screening using PHQ-2 questions with results documented in the chart.  Patient comes today complaining of right elbow swelling unable to hyperextend denies any falling denies any other complaints physical exam tenderness and swelling of the right elbow etiology to be determined  -Advanced directive reviewed    Follow-up  - Recent blood work reviewed  High vitamin B12 underlying neuropathy symptoms did not change discontinue vitamin B12 which patient is using now once a week  - Recurrent palpitation patient metoprolol increased recently by cardiology patient concerned about weakness muscle cramps in the lower extremity possible due to beta-blockers also fatigue and tiredness  - Patient need to go back to cardiology for further eval recommendation May need to follow-up electrophysiology for further recommendation regarding beta-blockers patient going to be scheduled another appointment with cardiology  - Patient need to avoid any NSAIDs due to underlying coronary artery disease  - - Hypercholesterolemia patient underlying- Statin intolerance continue on the newer injection Danielaqioana an outpatient in [place] with lower cost  - Benign prostatic hypertrophy symptoms improving now continue with current medication  - Coronary artery disease compensated continue with  aspirin daily  - COPD compensated on Breo as needed doing well    Follow-up 3 months

## 2024-04-10 DIAGNOSIS — I47.10 PAROXYSMAL SUPRAVENTRICULAR TACHYCARDIA (CMS-HCC): Primary | ICD-10-CM

## 2024-04-10 RX ORDER — DILTIAZEM HYDROCHLORIDE 120 MG/1
120 CAPSULE, COATED, EXTENDED RELEASE ORAL DAILY
Qty: 30 CAPSULE | Refills: 11 | Status: SHIPPED | OUTPATIENT
Start: 2024-04-10 | End: 2025-04-10

## 2024-04-10 NOTE — PROGRESS NOTES
C/o Muscle weakness and malaise on the increased dose of metoprolol   Change to diltiazem 120 mg daily   Consider sleep study and referral to electrophysiology

## 2024-04-17 NOTE — TELEPHONE ENCOUNTER
Reached out to patient to get him scheduled for his Inclisiran Injection. Patient took his last Repatha Injection on April 10th. 2024. Secure Chat to Renata Husain to confirm when patient can get scheudled. She stated one month after the patients last injection. Patient scheduled for May 10th,2024 @ 8:00 am in [place]. Patient verbalized and agreed to appointment.

## 2024-05-10 ENCOUNTER — INFUSION (OUTPATIENT)
Dept: HEMATOLOGY/ONCOLOGY | Facility: HOSPITAL | Age: 69
End: 2024-05-10
Payer: MEDICARE

## 2024-05-10 VITALS
SYSTOLIC BLOOD PRESSURE: 124 MMHG | OXYGEN SATURATION: 97 % | WEIGHT: 195.11 LBS | BODY MASS INDEX: 29.67 KG/M2 | RESPIRATION RATE: 16 BRPM | TEMPERATURE: 97 F | HEART RATE: 72 BPM | DIASTOLIC BLOOD PRESSURE: 82 MMHG

## 2024-05-10 DIAGNOSIS — E78.5 HYPERLIPIDEMIA, UNSPECIFIED HYPERLIPIDEMIA TYPE: ICD-10-CM

## 2024-05-10 DIAGNOSIS — E78.5 DYSLIPIDEMIA: ICD-10-CM

## 2024-05-10 PROCEDURE — 2500000004 HC RX 250 GENERAL PHARMACY W/ HCPCS (ALT 636 FOR OP/ED): Mod: JZ,TB | Performed by: NURSE PRACTITIONER

## 2024-05-10 PROCEDURE — 96372 THER/PROPH/DIAG INJ SC/IM: CPT

## 2024-05-10 RX ADMIN — INCLISIRAN 284 MG: 284 INJECTION, SOLUTION SUBCUTANEOUS at 07:54

## 2024-05-10 ASSESSMENT — PATIENT HEALTH QUESTIONNAIRE - PHQ9
1. LITTLE INTEREST OR PLEASURE IN DOING THINGS: NOT AT ALL
2. FEELING DOWN, DEPRESSED OR HOPELESS: NOT AT ALL
SUM OF ALL RESPONSES TO PHQ9 QUESTIONS 1 & 2: 0

## 2024-05-10 ASSESSMENT — ENCOUNTER SYMPTOMS
OCCASIONAL FEELINGS OF UNSTEADINESS: 0
LOSS OF SENSATION IN FEET: 0
DEPRESSION: 1

## 2024-05-10 ASSESSMENT — COLUMBIA-SUICIDE SEVERITY RATING SCALE - C-SSRS
2. HAVE YOU ACTUALLY HAD ANY THOUGHTS OF KILLING YOURSELF?: NO
1. IN THE PAST MONTH, HAVE YOU WISHED YOU WERE DEAD OR WISHED YOU COULD GO TO SLEEP AND NOT WAKE UP?: NO
6. HAVE YOU EVER DONE ANYTHING, STARTED TO DO ANYTHING, OR PREPARED TO DO ANYTHING TO END YOUR LIFE?: NO

## 2024-05-10 ASSESSMENT — PAIN SCALES - GENERAL: PAINLEVEL: 0-NO PAIN

## 2024-05-13 ENCOUNTER — APPOINTMENT (OUTPATIENT)
Dept: HEMATOLOGY/ONCOLOGY | Facility: HOSPITAL | Age: 69
End: 2024-05-13
Payer: MEDICARE

## 2024-06-19 NOTE — PROGRESS NOTES
Virtual or Telephone Consent    A telephone visit (audio only) between the patient (at the originating site) and the provider (at the distant site) was utilized to provide this telehealth service.   Verbal consent was requested and obtained from Talon Leone on this date, 06/20/24 for a telehealth visit.       HISTORY OF PRESENT ILLNESS:  Talon continues to do well with fesoterodine, happy with his level of control         Past Medical History  He has a past medical history of Acute bronchitis due to other specified organisms (04/30/2014), Arteriosclerosis of coronary artery, BPH (benign prostatic hyperplasia), CAD (coronary artery disease), Chondrocostal junction syndrome (tietze) (04/30/2014), Chronic obstructive asthma (Multi), Chronic sinusitis, unspecified, Deviated nasal septum, Epigastric abdominal tenderness (01/25/2013), HLD (hyperlipidemia), HTN (hypertension), Nasal congestion, Nausea (09/13/2013), OAB (overactive bladder), Obstructive sleep apnea (adult) (pediatric), Other amnesia (09/16/2013), Other conditions influencing health status, Other conditions influencing health status, Other conditions influencing health status, Other conditions influencing health status, Other hypertrophic disorders of the skin (04/12/2016), Otitis media, unspecified, right ear (12/11/2017), Pain in unspecified knee (09/10/2015), Pain in unspecified shoulder (10/01/2015), Pathological fracture, unspecified femur, initial encounter for fracture (Multi) (07/14/2015), Personal history of other diseases of male genital organs (10/27/2016), Personal history of other diseases of the digestive system (10/19/2016), Personal history of other diseases of the nervous system and sense organs (12/15/2017), Personal history of other diseases of the nervous system and sense organs (12/11/2012), Personal history of other diseases of the nervous system and sense organs (12/11/2012), Personal history of other diseases of the respiratory  system (10/21/2016), Personal history of other diseases of the respiratory system (01/12/2015), Personal history of other diseases of the respiratory system (02/15/2017), Personal history of other specified conditions (11/01/2016), and Unspecified injury of right shoulder and upper arm, initial encounter (09/29/2015).    Surgical History  He has a past surgical history that includes Hernia repair (01/25/2013); Transurethral resection of prostate; Bladder surgery; Coronary angioplasty with stent; Lumbar spine surgery; and Knee arthroscopy w/ debridement (Left).     Social History  He reports that he quit smoking about 23 years ago. His smoking use included cigarettes. He started smoking about 48 years ago. He has a 62.5 pack-year smoking history. He has never used smokeless tobacco. He reports current alcohol use of about 3.0 standard drinks of alcohol per week. He reports that he does not currently use drugs.    Family History  Family History   Problem Relation Name Age of Onset    Coronary artery disease Mother      Other (htn) Mother      Lung cancer Father      Bone cancer Father          Allergies  Penicillins and Statins-hmg-coa reductase inhibitors      A comprehensive 10+ review of systems was negative except for: see hpi                                   Assessment:     69-year-old status post TURP with persistent urinary urgency frequency and no evidence of obstructive uropathy     OAB:  Has failed oxybutynin, tadalafil, Flomax, and gemtesa   No response to neuromodulation, had explant and then had Botox May 25, 2022 and 200 units on 12/14 without improvement  doing great with fesoterodine  f/up in 1 year      I spent a total of 11 minutes speaking with the patient on the telephone      All questions and concerns were answered and addressed.  The patient expressed understanding and agrees with the plan.     Talon Suh MD

## 2024-06-20 ENCOUNTER — TELEMEDICINE (OUTPATIENT)
Dept: UROLOGY | Facility: CLINIC | Age: 69
End: 2024-06-20
Payer: MEDICARE

## 2024-06-20 DIAGNOSIS — N32.81 OAB (OVERACTIVE BLADDER): Primary | ICD-10-CM

## 2024-06-20 PROCEDURE — 99442 PR PHYS/QHP TELEPHONE EVALUATION 11-20 MIN: CPT | Performed by: STUDENT IN AN ORGANIZED HEALTH CARE EDUCATION/TRAINING PROGRAM

## 2024-06-20 RX ORDER — FESOTERODINE FUMARATE 8 MG/1
1 TABLET, FILM COATED, EXTENDED RELEASE ORAL DAILY
Qty: 90 TABLET | Refills: 3 | Status: SHIPPED | OUTPATIENT
Start: 2024-06-20 | End: 2025-06-20

## 2024-08-05 ENCOUNTER — LAB (OUTPATIENT)
Dept: LAB | Facility: LAB | Age: 69
End: 2024-08-05
Payer: MEDICARE

## 2024-08-05 DIAGNOSIS — E78.5 HYPERLIPIDEMIA, UNSPECIFIED HYPERLIPIDEMIA TYPE: ICD-10-CM

## 2024-08-05 DIAGNOSIS — E78.5 DYSLIPIDEMIA: ICD-10-CM

## 2024-08-05 LAB
CHOLEST SERPL-MCNC: 132 MG/DL (ref 0–199)
CHOLESTEROL/HDL RATIO: 2.4
HDLC SERPL-MCNC: 55.9 MG/DL
LDLC SERPL CALC-MCNC: 65 MG/DL
NON HDL CHOLESTEROL: 76 MG/DL (ref 0–149)
TRIGL SERPL-MCNC: 54 MG/DL (ref 0–149)
VLDL: 11 MG/DL (ref 0–40)

## 2024-08-05 PROCEDURE — 36415 COLL VENOUS BLD VENIPUNCTURE: CPT

## 2024-08-08 ENCOUNTER — INFUSION (OUTPATIENT)
Dept: HEMATOLOGY/ONCOLOGY | Facility: HOSPITAL | Age: 69
End: 2024-08-08
Payer: MEDICARE

## 2024-08-08 VITALS
BODY MASS INDEX: 30.6 KG/M2 | RESPIRATION RATE: 16 BRPM | TEMPERATURE: 97.2 F | SYSTOLIC BLOOD PRESSURE: 114 MMHG | HEART RATE: 70 BPM | OXYGEN SATURATION: 96 % | WEIGHT: 201.28 LBS | DIASTOLIC BLOOD PRESSURE: 75 MMHG

## 2024-08-08 DIAGNOSIS — I47.10 PAROXYSMAL SUPRAVENTRICULAR TACHYCARDIA (CMS-HCC): ICD-10-CM

## 2024-08-08 DIAGNOSIS — E78.5 HYPERLIPIDEMIA, UNSPECIFIED HYPERLIPIDEMIA TYPE: ICD-10-CM

## 2024-08-08 DIAGNOSIS — E78.5 DYSLIPIDEMIA: ICD-10-CM

## 2024-08-08 PROCEDURE — 2500000004 HC RX 250 GENERAL PHARMACY W/ HCPCS (ALT 636 FOR OP/ED): Mod: JZ,TB | Performed by: NURSE PRACTITIONER

## 2024-08-08 PROCEDURE — 96372 THER/PROPH/DIAG INJ SC/IM: CPT

## 2024-08-08 RX ORDER — EPINEPHRINE 0.3 MG/.3ML
0.3 INJECTION SUBCUTANEOUS EVERY 5 MIN PRN
Status: DISCONTINUED | OUTPATIENT
Start: 2024-08-08 | End: 2024-08-08 | Stop reason: HOSPADM

## 2024-08-08 RX ORDER — ALBUTEROL SULFATE 0.83 MG/ML
3 SOLUTION RESPIRATORY (INHALATION) AS NEEDED
OUTPATIENT
Start: 2024-08-10

## 2024-08-08 RX ORDER — FAMOTIDINE 10 MG/ML
20 INJECTION INTRAVENOUS ONCE AS NEEDED
Status: DISCONTINUED | OUTPATIENT
Start: 2024-08-08 | End: 2024-08-08 | Stop reason: HOSPADM

## 2024-08-08 RX ORDER — FAMOTIDINE 10 MG/ML
20 INJECTION INTRAVENOUS ONCE AS NEEDED
OUTPATIENT
Start: 2024-08-10

## 2024-08-08 RX ORDER — ALBUTEROL SULFATE 0.83 MG/ML
3 SOLUTION RESPIRATORY (INHALATION) AS NEEDED
Status: DISCONTINUED | OUTPATIENT
Start: 2024-08-08 | End: 2024-08-08 | Stop reason: HOSPADM

## 2024-08-08 RX ORDER — EPINEPHRINE 0.3 MG/.3ML
0.3 INJECTION SUBCUTANEOUS EVERY 5 MIN PRN
OUTPATIENT
Start: 2024-08-10

## 2024-08-08 RX ORDER — DIPHENHYDRAMINE HYDROCHLORIDE 50 MG/ML
50 INJECTION INTRAMUSCULAR; INTRAVENOUS AS NEEDED
OUTPATIENT
Start: 2024-08-10

## 2024-08-08 RX ORDER — DILTIAZEM HYDROCHLORIDE 240 MG/1
240 CAPSULE, COATED, EXTENDED RELEASE ORAL DAILY
Qty: 90 CAPSULE | Refills: 3 | Status: SHIPPED | OUTPATIENT
Start: 2024-08-08

## 2024-08-08 RX ORDER — DILTIAZEM HYDROCHLORIDE 240 MG/1
240 CAPSULE, COATED, EXTENDED RELEASE ORAL DAILY
Qty: 30 CAPSULE | Refills: 11 | Status: SHIPPED | OUTPATIENT
Start: 2024-08-08 | End: 2024-08-08

## 2024-08-08 RX ORDER — DIPHENHYDRAMINE HYDROCHLORIDE 50 MG/ML
50 INJECTION INTRAMUSCULAR; INTRAVENOUS AS NEEDED
Status: DISCONTINUED | OUTPATIENT
Start: 2024-08-08 | End: 2024-08-08 | Stop reason: HOSPADM

## 2024-08-08 ASSESSMENT — LIFESTYLE VARIABLES
HOW MANY STANDARD DRINKS CONTAINING ALCOHOL DO YOU HAVE ON A TYPICAL DAY: 1 OR 2
HOW OFTEN DO YOU HAVE SIX OR MORE DRINKS ON ONE OCCASION: NEVER
HOW OFTEN DO YOU HAVE A DRINK CONTAINING ALCOHOL: 2-4 TIMES A MONTH
SKIP TO QUESTIONS 9-10: 1
AUDIT-C TOTAL SCORE: 2

## 2024-08-08 ASSESSMENT — PAIN SCALES - GENERAL: PAINLEVEL: 0-NO PAIN

## 2024-09-30 ENCOUNTER — APPOINTMENT (OUTPATIENT)
Dept: CARDIOLOGY | Facility: HOSPITAL | Age: 69
End: 2024-09-30
Payer: MEDICARE

## 2024-09-30 ENCOUNTER — APPOINTMENT (OUTPATIENT)
Dept: RADIOLOGY | Facility: HOSPITAL | Age: 69
End: 2024-09-30
Payer: MEDICARE

## 2024-09-30 ENCOUNTER — HOSPITAL ENCOUNTER (EMERGENCY)
Facility: HOSPITAL | Age: 69
Discharge: HOME | End: 2024-09-30
Attending: EMERGENCY MEDICINE
Payer: MEDICARE

## 2024-09-30 VITALS
WEIGHT: 195 LBS | OXYGEN SATURATION: 97 % | BODY MASS INDEX: 29.55 KG/M2 | TEMPERATURE: 97.4 F | SYSTOLIC BLOOD PRESSURE: 120 MMHG | HEART RATE: 68 BPM | RESPIRATION RATE: 23 BRPM | DIASTOLIC BLOOD PRESSURE: 84 MMHG | HEIGHT: 68 IN

## 2024-09-30 DIAGNOSIS — K44.9 HIATAL HERNIA: ICD-10-CM

## 2024-09-30 DIAGNOSIS — R00.0 TACHYCARDIA: ICD-10-CM

## 2024-09-30 DIAGNOSIS — R10.13 EPIGASTRIC ABDOMINAL PAIN: Primary | ICD-10-CM

## 2024-09-30 DIAGNOSIS — R07.9 NONSPECIFIC CHEST PAIN: ICD-10-CM

## 2024-09-30 DIAGNOSIS — R00.2 PALPITATIONS: ICD-10-CM

## 2024-09-30 DIAGNOSIS — I47.10 PAROXYSMAL SUPRAVENTRICULAR TACHYCARDIA (CMS-HCC): ICD-10-CM

## 2024-09-30 DIAGNOSIS — I47.10 SVT (SUPRAVENTRICULAR TACHYCARDIA) (CMS-HCC): ICD-10-CM

## 2024-09-30 LAB
ALBUMIN SERPL BCP-MCNC: 4.4 G/DL (ref 3.4–5)
ALP SERPL-CCNC: 102 U/L (ref 33–136)
ALT SERPL W P-5'-P-CCNC: 16 U/L (ref 10–52)
ANION GAP SERPL CALC-SCNC: 11 MMOL/L (ref 10–20)
APPEARANCE UR: CLEAR
AST SERPL W P-5'-P-CCNC: 18 U/L (ref 9–39)
ATRIAL RATE: 147 BPM
BASOPHILS # BLD AUTO: 0.08 X10*3/UL (ref 0–0.1)
BASOPHILS NFR BLD AUTO: 0.6 %
BILIRUB SERPL-MCNC: 0.8 MG/DL (ref 0–1.2)
BILIRUB UR STRIP.AUTO-MCNC: NEGATIVE MG/DL
BNP SERPL-MCNC: 50 PG/ML (ref 0–99)
BUN SERPL-MCNC: 17 MG/DL (ref 6–23)
CALCIUM SERPL-MCNC: 9.7 MG/DL (ref 8.6–10.3)
CARDIAC TROPONIN I PNL SERPL HS: 15 NG/L (ref 0–20)
CARDIAC TROPONIN I PNL SERPL HS: 6 NG/L (ref 0–20)
CHLORIDE SERPL-SCNC: 105 MMOL/L (ref 98–107)
CO2 SERPL-SCNC: 26 MMOL/L (ref 21–32)
COLOR UR: NORMAL
CREAT SERPL-MCNC: 0.94 MG/DL (ref 0.5–1.3)
EGFRCR SERPLBLD CKD-EPI 2021: 87 ML/MIN/1.73M*2
EOSINOPHIL # BLD AUTO: 2.5 X10*3/UL (ref 0–0.4)
EOSINOPHIL NFR BLD AUTO: 20.1 %
ERYTHROCYTE [DISTWIDTH] IN BLOOD BY AUTOMATED COUNT: 12.6 % (ref 11.5–14.5)
GLUCOSE SERPL-MCNC: 100 MG/DL (ref 74–99)
GLUCOSE UR STRIP.AUTO-MCNC: NORMAL MG/DL
HCT VFR BLD AUTO: 50.5 % (ref 41–52)
HGB BLD-MCNC: 17.1 G/DL (ref 13.5–17.5)
IMM GRANULOCYTES # BLD AUTO: 0.05 X10*3/UL (ref 0–0.5)
IMM GRANULOCYTES NFR BLD AUTO: 0.4 % (ref 0–0.9)
KETONES UR STRIP.AUTO-MCNC: NEGATIVE MG/DL
LEUKOCYTE ESTERASE UR QL STRIP.AUTO: NEGATIVE
LIPASE SERPL-CCNC: 9 U/L (ref 9–82)
LYMPHOCYTES # BLD AUTO: 4.43 X10*3/UL (ref 0.8–3)
LYMPHOCYTES NFR BLD AUTO: 35.6 %
MAGNESIUM SERPL-MCNC: 2.18 MG/DL (ref 1.6–2.4)
MCH RBC QN AUTO: 29.9 PG (ref 26–34)
MCHC RBC AUTO-ENTMCNC: 33.9 G/DL (ref 32–36)
MCV RBC AUTO: 88 FL (ref 80–100)
MONOCYTES # BLD AUTO: 0.93 X10*3/UL (ref 0.05–0.8)
MONOCYTES NFR BLD AUTO: 7.5 %
NEUTROPHILS # BLD AUTO: 4.45 X10*3/UL (ref 1.6–5.5)
NEUTROPHILS NFR BLD AUTO: 35.8 %
NITRITE UR QL STRIP.AUTO: NEGATIVE
NRBC BLD-RTO: 0 /100 WBCS (ref 0–0)
PH UR STRIP.AUTO: 5 [PH]
PLATELET # BLD AUTO: 285 X10*3/UL (ref 150–450)
POTASSIUM SERPL-SCNC: 4.4 MMOL/L (ref 3.5–5.3)
PROT SERPL-MCNC: 6.8 G/DL (ref 6.4–8.2)
PROT UR STRIP.AUTO-MCNC: NEGATIVE MG/DL
Q ONSET: 213 MS
QRS COUNT: 23 BEATS
QRS DURATION: 108 MS
QT INTERVAL: 308 MS
QTC CALCULATION(BAZETT): 468 MS
QTC FREDERICIA: 407 MS
R AXIS: -53 DEGREES
RBC # BLD AUTO: 5.72 X10*6/UL (ref 4.5–5.9)
RBC # UR STRIP.AUTO: NEGATIVE /UL
SODIUM SERPL-SCNC: 138 MMOL/L (ref 136–145)
SP GR UR STRIP.AUTO: 1.03
T AXIS: 53 DEGREES
T OFFSET: 367 MS
TSH SERPL-ACNC: 2.29 MIU/L (ref 0.44–3.98)
UROBILINOGEN UR STRIP.AUTO-MCNC: NORMAL MG/DL
VENTRICULAR RATE: 139 BPM
WBC # BLD AUTO: 12.4 X10*3/UL (ref 4.4–11.3)

## 2024-09-30 PROCEDURE — 84484 ASSAY OF TROPONIN QUANT: CPT

## 2024-09-30 PROCEDURE — 36415 COLL VENOUS BLD VENIPUNCTURE: CPT

## 2024-09-30 PROCEDURE — 84443 ASSAY THYROID STIM HORMONE: CPT | Performed by: EMERGENCY MEDICINE

## 2024-09-30 PROCEDURE — 85025 COMPLETE CBC W/AUTO DIFF WBC: CPT

## 2024-09-30 PROCEDURE — 71045 X-RAY EXAM CHEST 1 VIEW: CPT | Mod: FOREIGN READ | Performed by: RADIOLOGY

## 2024-09-30 PROCEDURE — 83690 ASSAY OF LIPASE: CPT | Performed by: EMERGENCY MEDICINE

## 2024-09-30 PROCEDURE — 99291 CRITICAL CARE FIRST HOUR: CPT | Performed by: EMERGENCY MEDICINE

## 2024-09-30 PROCEDURE — 93005 ELECTROCARDIOGRAM TRACING: CPT

## 2024-09-30 PROCEDURE — 74177 CT ABD & PELVIS W/CONTRAST: CPT | Mod: FOREIGN READ | Performed by: RADIOLOGY

## 2024-09-30 PROCEDURE — 74177 CT ABD & PELVIS W/CONTRAST: CPT

## 2024-09-30 PROCEDURE — 80053 COMPREHEN METABOLIC PANEL: CPT

## 2024-09-30 PROCEDURE — 2500000004 HC RX 250 GENERAL PHARMACY W/ HCPCS (ALT 636 FOR OP/ED): Performed by: EMERGENCY MEDICINE

## 2024-09-30 PROCEDURE — 83880 ASSAY OF NATRIURETIC PEPTIDE: CPT

## 2024-09-30 PROCEDURE — 99285 EMERGENCY DEPT VISIT HI MDM: CPT | Mod: 25

## 2024-09-30 PROCEDURE — 2550000001 HC RX 255 CONTRASTS: Performed by: EMERGENCY MEDICINE

## 2024-09-30 PROCEDURE — 83735 ASSAY OF MAGNESIUM: CPT

## 2024-09-30 PROCEDURE — 71260 CT THORAX DX C+: CPT | Mod: FOREIGN READ | Performed by: RADIOLOGY

## 2024-09-30 PROCEDURE — 2500000001 HC RX 250 WO HCPCS SELF ADMINISTERED DRUGS (ALT 637 FOR MEDICARE OP): Performed by: EMERGENCY MEDICINE

## 2024-09-30 PROCEDURE — 81003 URINALYSIS AUTO W/O SCOPE: CPT | Performed by: EMERGENCY MEDICINE

## 2024-09-30 PROCEDURE — 71045 X-RAY EXAM CHEST 1 VIEW: CPT

## 2024-09-30 RX ORDER — DILTIAZEM HYDROCHLORIDE 120 MG/1
120 CAPSULE, COATED, EXTENDED RELEASE ORAL ONCE
Status: COMPLETED | OUTPATIENT
Start: 2024-09-30 | End: 2024-09-30

## 2024-09-30 RX ORDER — ADENOSINE 3 MG/ML
6 INJECTION, SOLUTION INTRAVENOUS ONCE
Status: DISCONTINUED | OUTPATIENT
Start: 2024-09-30 | End: 2024-09-30

## 2024-09-30 RX ORDER — DILTIAZEM HYDROCHLORIDE 360 MG/1
360 CAPSULE, EXTENDED RELEASE ORAL DAILY
Qty: 30 CAPSULE | Refills: 2 | Status: SHIPPED | OUTPATIENT
Start: 2024-09-30 | End: 2024-12-29

## 2024-09-30 RX ORDER — OMEPRAZOLE 20 MG/1
20 CAPSULE, DELAYED RELEASE ORAL DAILY
Qty: 30 CAPSULE | Refills: 0 | Status: SHIPPED | OUTPATIENT
Start: 2024-09-30 | End: 2024-10-30

## 2024-09-30 ASSESSMENT — PAIN DESCRIPTION - PAIN TYPE: TYPE: ACUTE PAIN

## 2024-09-30 ASSESSMENT — HEART SCORE
HEART SCORE: 4
HISTORY: SLIGHTLY SUSPICIOUS
AGE: 65+
RISK FACTORS: >2 RISK FACTORS OR HX OF ATHEROSCLEROTIC DISEASE
TROPONIN: LESS THAN OR EQUAL TO NORMAL LIMIT
ECG: NORMAL

## 2024-09-30 ASSESSMENT — PAIN SCALES - GENERAL: PAINLEVEL_OUTOF10: 8

## 2024-09-30 ASSESSMENT — PAIN DESCRIPTION - FREQUENCY: FREQUENCY: CONSTANT/CONTINUOUS

## 2024-09-30 ASSESSMENT — PAIN DESCRIPTION - LOCATION: LOCATION: CHEST

## 2024-09-30 ASSESSMENT — PAIN - FUNCTIONAL ASSESSMENT: PAIN_FUNCTIONAL_ASSESSMENT: 0-10

## 2024-09-30 ASSESSMENT — PAIN DESCRIPTION - ORIENTATION: ORIENTATION: MID

## 2024-09-30 NOTE — ED PROVIDER NOTES
HPI   Chief Complaint   Patient presents with    Chest Pain     States he started having epigastric pain for about a week now and today started having chest pain . States he feels dizzy as well.        69-year-old male presents for evaluation of heart racing sensation, chest pain, lightheadedness.  Patient states for the past week he has had epigastric abdominal pain that wraps around his abdomen in a band.  He states that pain waxes and wanes in severity.  No pattern to the pain.  Denies nausea or vomiting.  He states today he developed heart racing sensation and lightheadedness.  No falls or syncopal episodes.  No shortness of breath.  No urinary complaints.  Normal bowel movements.  No fever chills or sweats.                      Patient History   Past Medical History:   Diagnosis Date    Acute bronchitis due to other specified organisms 04/30/2014    Viral bronchitis    Arteriosclerosis of coronary artery     BPH (benign prostatic hyperplasia)     CAD (coronary artery disease)     Chondrocostal junction syndrome (tietze) 04/30/2014    Costochondritis    Chronic obstructive asthma (Multi)     Chronic sinusitis, unspecified     Chronic sinusitis    Deviated nasal septum     Acquired deviated nasal septum    Epigastric abdominal tenderness 01/25/2013    Abdominal tenderness, epigastric    HLD (hyperlipidemia)     HTN (hypertension)     Nasal congestion     Nasal congestion    Nausea 09/13/2013    Nausea    OAB (overactive bladder)     Obstructive sleep apnea (adult) (pediatric)     does not use his CPAP    Other amnesia 09/16/2013    Memory loss    Other conditions influencing health status     Acute Otitis Externa Of The Left Ear    Other conditions influencing health status     Acute Mucoid Otitis Media Of The Left Ear    Other conditions influencing health status     Tympanic Membrane Perforation Of The Left Ear    Other conditions influencing health status     Arthritis    Other hypertrophic disorders of the  skin 04/12/2016    Skin tag    Otitis media, unspecified, right ear 12/11/2017    Acute right otitis media    Pain in unspecified knee 09/10/2015    Knee pain    Pain in unspecified shoulder 10/01/2015    Shoulder pain    Pathological fracture, unspecified femur, initial encounter for fracture (Multi) 07/14/2015    Insufficiency fracture of medial femoral condyle    Personal history of other diseases of male genital organs 10/27/2016    History of acute prostatitis    Personal history of other diseases of the digestive system 10/19/2016    History of acute gastritis    Personal history of other diseases of the nervous system and sense organs 12/15/2017    History of acute otitis media    Personal history of other diseases of the nervous system and sense organs 12/11/2012    History of acute otitis externa    Personal history of other diseases of the nervous system and sense organs 12/11/2012    History of impacted cerumen    Personal history of other diseases of the respiratory system 10/21/2016    History of acute bronchitis    Personal history of other diseases of the respiratory system 01/12/2015    History of sinusitis    Personal history of other diseases of the respiratory system 02/15/2017    History of acute bronchitis    Personal history of other specified conditions 11/01/2016    History of shortness of breath    Unspecified injury of right shoulder and upper arm, initial encounter 09/29/2015    Injury of shoulder, right     Past Surgical History:   Procedure Laterality Date    BLADDER SURGERY      Axonics stage 1    CORONARY ANGIOPLASTY WITH STENT PLACEMENT      2017    HERNIA REPAIR  01/25/2013    Hernia Repair-bilateral inguinal    KNEE ARTHROSCOPY W/ DEBRIDEMENT Left     for arthritis    LUMBAR SPINE SURGERY      microdiscectomy    TRANSURETHRAL RESECTION OF PROSTATE       Family History   Problem Relation Name Age of Onset    Coronary artery disease Mother      Other (htn) Mother      Lung cancer  Father      Bone cancer Father       Social History     Tobacco Use    Smoking status: Former     Current packs/day: 0.00     Average packs/day: 2.5 packs/day for 25.0 years (62.5 ttl pk-yrs)     Types: Cigarettes     Start date:      Quit date:      Years since quittin.7    Smokeless tobacco: Never   Vaping Use    Vaping status: Never Used   Substance Use Topics    Alcohol use: Yes     Alcohol/week: 3.0 standard drinks of alcohol     Types: 3 Standard drinks or equivalent per week     Comment: Beers a few times per week    Drug use: Not Currently       Physical Exam   ED Triage Vitals [24 1245]   Temperature Heart Rate Respirations BP   36.3 °C (97.4 °F) (!) 135 20 102/71      Pulse Ox Temp Source Heart Rate Source Patient Position   96 % Oral Monitor Sitting      BP Location FiO2 (%)     Left arm --       Physical Exam  Vitals and nursing note reviewed.   Constitutional:       General: He is not in acute distress.     Appearance: He is well-developed.   HENT:      Head: Normocephalic and atraumatic.   Eyes:      Conjunctiva/sclera: Conjunctivae normal.   Cardiovascular:      Rate and Rhythm: Regular rhythm. Tachycardia present.      Pulses:           Radial pulses are 2+ on the right side and 2+ on the left side.        Dorsalis pedis pulses are 2+ on the right side and 2+ on the left side.      Heart sounds: No murmur heard.  Pulmonary:      Effort: Pulmonary effort is normal. No respiratory distress.      Breath sounds: Normal breath sounds.   Chest:      Chest wall: No tenderness.   Abdominal:      Palpations: Abdomen is soft.      Tenderness: There is abdominal tenderness in the epigastric area. There is no guarding or rebound.   Musculoskeletal:         General: No swelling.      Cervical back: Neck supple.   Skin:     General: Skin is warm and dry.      Capillary Refill: Capillary refill takes less than 2 seconds.   Neurological:      General: No focal deficit present.      Mental Status:  He is alert and oriented to person, place, and time.      Cranial Nerves: No cranial nerve deficit.      Motor: No weakness.   Psychiatric:         Mood and Affect: Mood normal.           ED Course & MDM   ED Course as of 09/30/24 1525   Mon Sep 30, 2024   1249 ECG 12 lead  EKG interpreted by me shows supraventricular tachycardia with rate of 139.  Left axis deviation.  Left anterior fascicular block.  No acute injury pattern.  Changed from most recent prior EKG that showed sinus rhythm. [BT]   1358 ECG 12 lead  Repeat EKG interpreted by me shows sinus rhythm with left axis deviation.  Incomplete right bundle branch block.  No acute injury pattern.  Changed from prior EKG earlier today that showed SVT. [BT]   1359 69-year-old male presents with epigastric abdominal pain, chest pain, lightheadedness, palpitations.  Initial EKG shows heart rate in the 130s.  Concerning for SVT.  Saline bolus.  I was planning to give adenosine.  Shortly after his saline bolus started, he converted to sinus rhythm.  Given that he has epigastric abdominal pain and chest pain will check CT chest abdomen pelvis.  Labs including serial troponins, TSH.  Will discuss with cardiology.  Will reevaluate after initial workup, treatment. [BT]   1414 Spoke with cardiology Dr. Ramirez who advised increase patient's Cardizem to 360 mg daily extended release.  He was given Cardizem 120 mg extended release here now.  Per Dr. Ramirez, if his workup is reassuring he can be discharged home to follow-up in the office.  If he has recurrence of SVT, he will be considered for cardiac ablation. [BT]   1522 CT chest abdomen pelvis w IV contrast  IMPRESSION:  Chest: No acute cardiopulmonary disease.  Coronary calcifications are marker for coronary disease.  ABDOMEN: No acute inflammatory changes.  Mild splenomegaly.  Small hiatal hernia.  Pelvis: No acute inflammatory changes.    Noted [BT]   1523 Troponin negative x 2.  Doubt acute coronary syndrome.  Chest pain  likely secondary to tachycardia. [BT]   1524 He has remained in sinus rhythm subsequently after the initial episode of SVT. [BT]   1524 CT chest abdomen pelvis shows coronary calcifications.  No acute findings in the abdomen or pelvis.  He does have a small hiatal hernia. [BT]   1524 Exact cause of 1 week history of chest pain/epigastric pain unclear.  May be component of pain related to the small hiatal hernia.  Placed on PPI.  I considered admission.  He has remained in sinus rhythm and is now asymptomatic.  Referred to PCP and cardiology for follow-up.  Advised patient to return for repeat evaluation at any time with recurrence of palpitations, heart racing, chest pain, or any other concerns.  He agrees with this plan verbalized understanding.  Discharged home. [BT]      ED Course User Index  [BT] Gomez Tony DO         Diagnoses as of 09/30/24 1525   Tachycardia   Palpitations   Nonspecific chest pain   SVT (supraventricular tachycardia) (CMS-HCC)   Epigastric abdominal pain                 No data recorded       HEART Score: 4 (09/30/24 1507 : Gomez Tony DO)                         Medical Decision Making      Procedure  Critical Care    Performed by: Gomez Tony DO  Authorized by: Gomez Tony DO    Critical care provider statement:     Critical care time (minutes):  45    Critical care start time:  9/30/2024 1:30 PM    Critical care end time:  9/30/2024 2:15 PM    Critical care was necessary to treat or prevent imminent or life-threatening deterioration of the following conditions:  Cardiac failure (SVT)    Critical care was time spent personally by me on the following activities:  Ordering and review of laboratory studies, ordering and review of radiographic studies, pulse oximetry, re-evaluation of patient's condition, discussions with consultants, examination of patient and review of old charts       Gomez Tony DO  09/30/24 1525       Gomez Tony  DO  09/30/24 1526

## 2024-10-01 ENCOUNTER — LAB (OUTPATIENT)
Dept: LAB | Facility: LAB | Age: 69
End: 2024-10-01
Payer: COMMERCIAL

## 2024-10-01 LAB
ATRIAL RATE: 78 BPM
P AXIS: 59 DEGREES
P OFFSET: 194 MS
P ONSET: 128 MS
PR INTERVAL: 164 MS
Q ONSET: 210 MS
QRS COUNT: 13 BEATS
QRS DURATION: 92 MS
QT INTERVAL: 386 MS
QTC CALCULATION(BAZETT): 440 MS
QTC FREDERICIA: 421 MS
R AXIS: -40 DEGREES
T AXIS: 31 DEGREES
T OFFSET: 403 MS
VENTRICULAR RATE: 78 BPM

## 2024-10-03 ENCOUNTER — OFFICE VISIT (OUTPATIENT)
Dept: PRIMARY CARE | Facility: CLINIC | Age: 69
End: 2024-10-03
Payer: MEDICARE

## 2024-10-03 VITALS
BODY MASS INDEX: 30.81 KG/M2 | SYSTOLIC BLOOD PRESSURE: 110 MMHG | WEIGHT: 202.6 LBS | OXYGEN SATURATION: 97 % | TEMPERATURE: 96.1 F | HEART RATE: 53 BPM | DIASTOLIC BLOOD PRESSURE: 74 MMHG

## 2024-10-03 DIAGNOSIS — I47.10 SVT (SUPRAVENTRICULAR TACHYCARDIA) (CMS-HCC): ICD-10-CM

## 2024-10-03 DIAGNOSIS — N52.9 ERECTILE DYSFUNCTION, UNSPECIFIED ERECTILE DYSFUNCTION TYPE: Primary | ICD-10-CM

## 2024-10-03 DIAGNOSIS — N40.0 BENIGN PROSTATIC HYPERPLASIA, UNSPECIFIED WHETHER LOWER URINARY TRACT SYMPTOMS PRESENT: ICD-10-CM

## 2024-10-03 DIAGNOSIS — Z98.61 S/P PTCA (PERCUTANEOUS TRANSLUMINAL CORONARY ANGIOPLASTY): ICD-10-CM

## 2024-10-03 DIAGNOSIS — K44.9 HIATAL HERNIA: ICD-10-CM

## 2024-10-03 DIAGNOSIS — J44.9 CHRONIC OBSTRUCTIVE PULMONARY DISEASE, UNSPECIFIED COPD TYPE (MULTI): ICD-10-CM

## 2024-10-03 PROCEDURE — 1036F TOBACCO NON-USER: CPT | Performed by: INTERNAL MEDICINE

## 2024-10-03 PROCEDURE — 1160F RVW MEDS BY RX/DR IN RCRD: CPT | Performed by: INTERNAL MEDICINE

## 2024-10-03 PROCEDURE — 3074F SYST BP LT 130 MM HG: CPT | Performed by: INTERNAL MEDICINE

## 2024-10-03 PROCEDURE — 1159F MED LIST DOCD IN RCRD: CPT | Performed by: INTERNAL MEDICINE

## 2024-10-03 PROCEDURE — 3078F DIAST BP <80 MM HG: CPT | Performed by: INTERNAL MEDICINE

## 2024-10-03 PROCEDURE — 99214 OFFICE O/P EST MOD 30 MIN: CPT | Performed by: INTERNAL MEDICINE

## 2024-10-03 RX ORDER — SILDENAFIL 100 MG/1
100 TABLET, FILM COATED ORAL DAILY PRN
Qty: 12 TABLET | Refills: 3 | Status: SHIPPED | OUTPATIENT
Start: 2024-10-03 | End: 2025-10-03

## 2024-10-03 ASSESSMENT — ENCOUNTER SYMPTOMS
BLOOD IN STOOL: 0
SORE THROAT: 0
PALPITATIONS: 0
BRUISES/BLEEDS EASILY: 0
DIARRHEA: 0
HEADACHES: 0
SINUS PAIN: 0
COUGH: 0
WHEEZING: 0
UNEXPECTED WEIGHT CHANGE: 0
DIFFICULTY URINATING: 0
DIZZINESS: 0
FEVER: 0
FATIGUE: 0
ABDOMINAL PAIN: 0
ARTHRALGIAS: 0

## 2024-10-03 NOTE — PROGRESS NOTES
Subjective   Patient ID: Talon Leone is a 69 y.o. male who presents for ER Follow-up and ateriosclerosis .    - Recent blood work reviewed and emergency room records reviewed with patient labs and CT scan results reviewed  --Paroxysmal supraventricular tachycardia patient to continue on current dose echocardiogram 360 mg daily follow-up cardiology as a scheduled with Dr. Ramirez  -CT showed small hiatal hernia continue with proton pump inhibitor until reevaluation in 3 months counseled about meal size and diet control  -Erectile dysfunction patient may try sildenafil 100 mg given instruction about use and side effect  - Screening for colon cancer up-to-date repeat done in 2020 3 repeat in July 2028 Dr. Delcid  - Recent blood work reviewed and up-to-date  -- Patient need to avoid any NSAIDs due to underlying coronary artery disease  - - Hypercholesterolemia patient underlying- Statin intolerance continue on  Leqvic an outpatient in [place]   - Benign prostatic hypertrophy symptoms improving now continue with current medication  - Coronary artery disease compensated continue with aspirin daily  - COPD compensated on Breo as needed doing well  Follow-up 3 months         ER Follow-up  Pertinent negatives include no abdominal pain, arthralgias, chest pain, congestion, coughing, fatigue, fever, headaches, rash or sore throat.          Review of Systems   Constitutional:  Negative for fatigue, fever and unexpected weight change.   HENT:  Negative for congestion, ear discharge, ear pain, mouth sores, sinus pain and sore throat.    Eyes:  Negative for visual disturbance.   Respiratory:  Negative for cough and wheezing.    Cardiovascular:  Negative for chest pain, palpitations and leg swelling.   Gastrointestinal:  Negative for abdominal pain, blood in stool and diarrhea.   Genitourinary:  Negative for difficulty urinating.   Musculoskeletal:  Negative for arthralgias.   Skin:  Negative for rash.   Neurological:  Negative for  dizziness and headaches.   Hematological:  Does not bruise/bleed easily.   Psychiatric/Behavioral:  Negative for behavioral problems.    All other systems reviewed and are negative.      Objective   Lab Results   Component Value Date    HGBA1C 5.0 04/04/2023      /74   Pulse 53   Temp 35.6 °C (96.1 °F)   Wt 91.9 kg (202 lb 9.6 oz)   SpO2 97%   BMI 30.81 kg/m²   Lab Results   Component Value Date    WBC 12.4 (H) 09/30/2024    HGB 17.1 09/30/2024    HCT 50.5 09/30/2024     09/30/2024    CHOL 132 08/05/2024    TRIG 54 08/05/2024    HDL 55.9 08/05/2024    ALT 16 09/30/2024    AST 18 09/30/2024     09/30/2024    K 4.4 09/30/2024     09/30/2024    CREATININE 0.94 09/30/2024    BUN 17 09/30/2024    CO2 26 09/30/2024    TSH 2.29 09/30/2024    PSA 3.0 03/21/2018    INR 0.9 05/19/2021    HGBA1C 5.0 04/04/2023     par   Physical Exam  Vitals and nursing note reviewed.   Constitutional:       Appearance: Normal appearance.   HENT:      Head: Normocephalic.      Nose: Nose normal.   Eyes:      Conjunctiva/sclera: Conjunctivae normal.      Pupils: Pupils are equal, round, and reactive to light.   Cardiovascular:      Rate and Rhythm: Regular rhythm.   Pulmonary:      Effort: Pulmonary effort is normal.      Breath sounds: Normal breath sounds.   Abdominal:      General: Abdomen is flat.      Palpations: Abdomen is soft.   Musculoskeletal:      Cervical back: Neck supple.   Skin:     General: Skin is warm.   Neurological:      General: No focal deficit present.      Mental Status: He is oriented to person, place, and time.   Psychiatric:         Mood and Affect: Mood normal.         Assessment/Plan   Talon was seen today for er follow-up and ateriosclerosis .  Diagnoses and all orders for this visit:  Erectile dysfunction, unspecified erectile dysfunction type (Primary)  -     sildenafil (Viagra) 100 mg tablet; Take 1 tablet (100 mg) by mouth once daily as needed for erectile dysfunction.  Hiatal  hernia  SVT (supraventricular tachycardia) (CMS-[facility])  S/P PTCA (percutaneous transluminal coronary angioplasty)  Chronic obstructive pulmonary disease, unspecified COPD type (Multi)  Benign prostatic hyperplasia, unspecified whether lower urinary tract symptoms present  Other orders  -     Follow Up In Primary Care - Established  -     Follow Up In Primary Care - Medicare Annual; Future   - Recent blood work reviewed and emergency room records reviewed with patient labs and CT scan results reviewed  --Paroxysmal supraventricular tachycardia patient to continue on current dose echocardiogram 360 mg daily follow-up cardiology as a scheduled with Dr. Ramirez  -CT showed small hiatal hernia continue with proton pump inhibitor until reevaluation in 3 months counseled about meal size and diet control  -Erectile dysfunction patient may try sildenafil 100 mg given instruction about use and side effect  - Screening for colon cancer up-to-date repeat done in 2020 3 repeat in July 2028 Dr. Delcid  - Recent blood work reviewed and up-to-date  -- Patient need to avoid any NSAIDs due to underlying coronary artery disease  - - Hypercholesterolemia patient underlying- Statin intolerance continue on  Leqvic an outpatient in [place]   - Benign prostatic hypertrophy symptoms improving now continue with current medication  - Coronary artery disease compensated continue with aspirin daily  - COPD compensated on Breo as needed doing well  Follow-up 3 months

## 2024-10-09 ENCOUNTER — APPOINTMENT (OUTPATIENT)
Dept: PRIMARY CARE | Facility: CLINIC | Age: 69
End: 2024-10-09
Payer: MEDICARE

## 2024-10-10 ENCOUNTER — APPOINTMENT (OUTPATIENT)
Dept: CARDIOLOGY | Facility: CLINIC | Age: 69
End: 2024-10-10
Payer: COMMERCIAL

## 2024-10-14 ENCOUNTER — APPOINTMENT (OUTPATIENT)
Dept: CARDIOLOGY | Facility: CLINIC | Age: 69
End: 2024-10-14
Payer: COMMERCIAL

## 2024-10-14 DIAGNOSIS — J44.9 CHRONIC OBSTRUCTIVE PULMONARY DISEASE, UNSPECIFIED COPD TYPE (MULTI): ICD-10-CM

## 2024-10-14 DIAGNOSIS — E53.8 VITAMIN B12 DEFICIENCY: ICD-10-CM

## 2024-10-14 DIAGNOSIS — I47.10 PAROXYSMAL SUPRAVENTRICULAR TACHYCARDIA (CMS-HCC): ICD-10-CM

## 2024-10-14 DIAGNOSIS — R09.89 CAROTID BRUIT, UNSPECIFIED LATERALITY: ICD-10-CM

## 2024-10-14 DIAGNOSIS — R25.2 MUSCLE CRAMPS: ICD-10-CM

## 2024-10-14 DIAGNOSIS — Z98.61 S/P PTCA (PERCUTANEOUS TRANSLUMINAL CORONARY ANGIOPLASTY): ICD-10-CM

## 2024-10-29 DIAGNOSIS — K44.9 HIATAL HERNIA: ICD-10-CM

## 2024-10-29 DIAGNOSIS — R10.13 EPIGASTRIC ABDOMINAL PAIN: ICD-10-CM

## 2024-10-29 RX ORDER — OMEPRAZOLE 20 MG/1
20 CAPSULE, DELAYED RELEASE ORAL DAILY
Qty: 90 CAPSULE | Refills: 1 | Status: SHIPPED | OUTPATIENT
Start: 2024-10-29 | End: 2025-04-27

## 2024-11-06 ENCOUNTER — APPOINTMENT (OUTPATIENT)
Dept: HEMATOLOGY/ONCOLOGY | Facility: HOSPITAL | Age: 69
End: 2024-11-06
Payer: COMMERCIAL

## 2024-11-13 ENCOUNTER — APPOINTMENT (OUTPATIENT)
Dept: HEMATOLOGY/ONCOLOGY | Facility: HOSPITAL | Age: 69
End: 2024-11-13
Payer: MEDICARE

## 2024-12-14 ENCOUNTER — LAB (OUTPATIENT)
Dept: LAB | Facility: LAB | Age: 69
End: 2024-12-14
Payer: COMMERCIAL

## 2024-12-14 DIAGNOSIS — E78.5 HYPERLIPIDEMIA, UNSPECIFIED HYPERLIPIDEMIA TYPE: ICD-10-CM

## 2024-12-14 DIAGNOSIS — E78.5 DYSLIPIDEMIA: ICD-10-CM

## 2024-12-14 DIAGNOSIS — R09.89 CAROTID BRUIT, UNSPECIFIED LATERALITY: ICD-10-CM

## 2024-12-14 DIAGNOSIS — I47.10 PAROXYSMAL SUPRAVENTRICULAR TACHYCARDIA (CMS-HCC): ICD-10-CM

## 2024-12-14 DIAGNOSIS — E53.8 VITAMIN B12 DEFICIENCY: ICD-10-CM

## 2024-12-14 DIAGNOSIS — J44.9 CHRONIC OBSTRUCTIVE PULMONARY DISEASE, UNSPECIFIED COPD TYPE (MULTI): ICD-10-CM

## 2024-12-14 DIAGNOSIS — Z98.61 S/P PTCA (PERCUTANEOUS TRANSLUMINAL CORONARY ANGIOPLASTY): ICD-10-CM

## 2024-12-14 DIAGNOSIS — R25.2 MUSCLE CRAMPS: ICD-10-CM

## 2024-12-14 LAB
25(OH)D3 SERPL-MCNC: 34 NG/ML (ref 30–100)
ALBUMIN SERPL BCP-MCNC: 4.3 G/DL (ref 3.4–5)
ALP SERPL-CCNC: 88 U/L (ref 33–136)
ALT SERPL W P-5'-P-CCNC: 16 U/L (ref 10–52)
ANION GAP SERPL CALC-SCNC: 11 MMOL/L (ref 10–20)
AST SERPL W P-5'-P-CCNC: 16 U/L (ref 9–39)
BASOPHILS # BLD AUTO: 0.03 X10*3/UL (ref 0–0.1)
BASOPHILS NFR BLD AUTO: 0.5 %
BILIRUB SERPL-MCNC: 0.8 MG/DL (ref 0–1.2)
BNP SERPL-MCNC: 13 PG/ML (ref 0–99)
BUN SERPL-MCNC: 16 MG/DL (ref 6–23)
CALCIUM SERPL-MCNC: 9.3 MG/DL (ref 8.6–10.3)
CHLORIDE SERPL-SCNC: 104 MMOL/L (ref 98–107)
CHOLEST SERPL-MCNC: 154 MG/DL (ref 0–199)
CHOLESTEROL/HDL RATIO: 3.1
CO2 SERPL-SCNC: 27 MMOL/L (ref 21–32)
CREAT SERPL-MCNC: 0.96 MG/DL (ref 0.5–1.3)
EGFRCR SERPLBLD CKD-EPI 2021: 85 ML/MIN/1.73M*2
EOSINOPHIL # BLD AUTO: 0.21 X10*3/UL (ref 0–0.4)
EOSINOPHIL NFR BLD AUTO: 3.4 %
ERYTHROCYTE [DISTWIDTH] IN BLOOD BY AUTOMATED COUNT: 12.1 % (ref 11.5–14.5)
EST. AVERAGE GLUCOSE BLD GHB EST-MCNC: 94 MG/DL
GLUCOSE SERPL-MCNC: 87 MG/DL (ref 74–99)
HBA1C MFR BLD: 4.9 %
HCT VFR BLD AUTO: 47.8 % (ref 41–52)
HDLC SERPL-MCNC: 50.4 MG/DL
HGB BLD-MCNC: 15.8 G/DL (ref 13.5–17.5)
IMM GRANULOCYTES # BLD AUTO: 0.02 X10*3/UL (ref 0–0.5)
IMM GRANULOCYTES NFR BLD AUTO: 0.3 % (ref 0–0.9)
LDLC SERPL CALC-MCNC: 88 MG/DL
LYMPHOCYTES # BLD AUTO: 2.87 X10*3/UL (ref 0.8–3)
LYMPHOCYTES NFR BLD AUTO: 46.4 %
MAGNESIUM SERPL-MCNC: 2.11 MG/DL (ref 1.6–2.4)
MCH RBC QN AUTO: 29.7 PG (ref 26–34)
MCHC RBC AUTO-ENTMCNC: 33.1 G/DL (ref 32–36)
MCV RBC AUTO: 90 FL (ref 80–100)
MONOCYTES # BLD AUTO: 0.45 X10*3/UL (ref 0.05–0.8)
MONOCYTES NFR BLD AUTO: 7.3 %
NEUTROPHILS # BLD AUTO: 2.61 X10*3/UL (ref 1.6–5.5)
NEUTROPHILS NFR BLD AUTO: 42.1 %
NON HDL CHOLESTEROL: 104 MG/DL (ref 0–149)
NRBC BLD-RTO: 0 /100 WBCS (ref 0–0)
PLATELET # BLD AUTO: 205 X10*3/UL (ref 150–450)
POTASSIUM SERPL-SCNC: 4 MMOL/L (ref 3.5–5.3)
PROT SERPL-MCNC: 6.6 G/DL (ref 6.4–8.2)
RBC # BLD AUTO: 5.32 X10*6/UL (ref 4.5–5.9)
SODIUM SERPL-SCNC: 138 MMOL/L (ref 136–145)
TRIGL SERPL-MCNC: 76 MG/DL (ref 0–149)
TSH SERPL-ACNC: 2.54 MIU/L (ref 0.44–3.98)
VLDL: 15 MG/DL (ref 0–40)
WBC # BLD AUTO: 6.2 X10*3/UL (ref 4.4–11.3)

## 2024-12-14 PROCEDURE — 82306 VITAMIN D 25 HYDROXY: CPT

## 2024-12-14 PROCEDURE — 83036 HEMOGLOBIN GLYCOSYLATED A1C: CPT

## 2024-12-14 PROCEDURE — 36415 COLL VENOUS BLD VENIPUNCTURE: CPT

## 2024-12-20 ENCOUNTER — APPOINTMENT (OUTPATIENT)
Dept: CARDIOLOGY | Facility: HOSPITAL | Age: 69
End: 2024-12-20
Payer: MEDICARE

## 2024-12-20 ENCOUNTER — HOSPITAL ENCOUNTER (EMERGENCY)
Facility: HOSPITAL | Age: 69
Discharge: HOME | End: 2024-12-20
Attending: EMERGENCY MEDICINE
Payer: MEDICARE

## 2024-12-20 ENCOUNTER — APPOINTMENT (OUTPATIENT)
Dept: RADIOLOGY | Facility: HOSPITAL | Age: 69
End: 2024-12-20
Payer: MEDICARE

## 2024-12-20 VITALS
RESPIRATION RATE: 18 BRPM | HEIGHT: 68 IN | TEMPERATURE: 97.1 F | SYSTOLIC BLOOD PRESSURE: 137 MMHG | DIASTOLIC BLOOD PRESSURE: 93 MMHG | HEART RATE: 62 BPM | WEIGHT: 195 LBS | OXYGEN SATURATION: 100 % | BODY MASS INDEX: 29.55 KG/M2

## 2024-12-20 DIAGNOSIS — I47.10 NONSUSTAINED PAROXYSMAL SUPRAVENTRICULAR TACHYCARDIA (CMS-HCC): Primary | ICD-10-CM

## 2024-12-20 DIAGNOSIS — Z86.79 HISTORY OF PAROXYSMAL SUPRAVENTRICULAR TACHYCARDIA: ICD-10-CM

## 2024-12-20 DIAGNOSIS — M25.512 ACUTE PAIN OF LEFT SHOULDER: ICD-10-CM

## 2024-12-20 DIAGNOSIS — R00.2 PALPITATIONS: ICD-10-CM

## 2024-12-20 LAB
ALBUMIN SERPL BCP-MCNC: 4.3 G/DL (ref 3.4–5)
ALP SERPL-CCNC: 93 U/L (ref 33–136)
ALT SERPL W P-5'-P-CCNC: 20 U/L (ref 10–52)
ANION GAP SERPL CALC-SCNC: 9 MMOL/L (ref 10–20)
AST SERPL W P-5'-P-CCNC: 17 U/L (ref 9–39)
BASOPHILS # BLD AUTO: 0.02 X10*3/UL (ref 0–0.1)
BASOPHILS NFR BLD AUTO: 0.3 %
BILIRUB SERPL-MCNC: 0.5 MG/DL (ref 0–1.2)
BUN SERPL-MCNC: 18 MG/DL (ref 6–23)
CALCIUM SERPL-MCNC: 9.6 MG/DL (ref 8.6–10.3)
CARDIAC TROPONIN I PNL SERPL HS: <3 NG/L (ref 0–20)
CARDIAC TROPONIN I PNL SERPL HS: <3 NG/L (ref 0–20)
CHLORIDE SERPL-SCNC: 105 MMOL/L (ref 98–107)
CO2 SERPL-SCNC: 30 MMOL/L (ref 21–32)
CREAT SERPL-MCNC: 0.83 MG/DL (ref 0.5–1.3)
EGFRCR SERPLBLD CKD-EPI 2021: >90 ML/MIN/1.73M*2
EOSINOPHIL # BLD AUTO: 0.18 X10*3/UL (ref 0–0.4)
EOSINOPHIL NFR BLD AUTO: 2.8 %
ERYTHROCYTE [DISTWIDTH] IN BLOOD BY AUTOMATED COUNT: 12.3 % (ref 11.5–14.5)
GLUCOSE SERPL-MCNC: 88 MG/DL (ref 74–99)
HCT VFR BLD AUTO: 47 % (ref 41–52)
HGB BLD-MCNC: 15.9 G/DL (ref 13.5–17.5)
IMM GRANULOCYTES # BLD AUTO: 0.03 X10*3/UL (ref 0–0.5)
IMM GRANULOCYTES NFR BLD AUTO: 0.5 % (ref 0–0.9)
LYMPHOCYTES # BLD AUTO: 2.81 X10*3/UL (ref 0.8–3)
LYMPHOCYTES NFR BLD AUTO: 43.3 %
MAGNESIUM SERPL-MCNC: 2.1 MG/DL (ref 1.6–2.4)
MCH RBC QN AUTO: 30.1 PG (ref 26–34)
MCHC RBC AUTO-ENTMCNC: 33.8 G/DL (ref 32–36)
MCV RBC AUTO: 89 FL (ref 80–100)
MONOCYTES # BLD AUTO: 0.45 X10*3/UL (ref 0.05–0.8)
MONOCYTES NFR BLD AUTO: 6.9 %
NEUTROPHILS # BLD AUTO: 3 X10*3/UL (ref 1.6–5.5)
NEUTROPHILS NFR BLD AUTO: 46.2 %
NRBC BLD-RTO: 0 /100 WBCS (ref 0–0)
PLATELET # BLD AUTO: 203 X10*3/UL (ref 150–450)
POTASSIUM SERPL-SCNC: 3.9 MMOL/L (ref 3.5–5.3)
PROT SERPL-MCNC: 6.8 G/DL (ref 6.4–8.2)
RBC # BLD AUTO: 5.29 X10*6/UL (ref 4.5–5.9)
SODIUM SERPL-SCNC: 140 MMOL/L (ref 136–145)
TSH SERPL-ACNC: 1.79 MIU/L (ref 0.44–3.98)
WBC # BLD AUTO: 6.5 X10*3/UL (ref 4.4–11.3)

## 2024-12-20 PROCEDURE — 84443 ASSAY THYROID STIM HORMONE: CPT | Performed by: EMERGENCY MEDICINE

## 2024-12-20 PROCEDURE — 80053 COMPREHEN METABOLIC PANEL: CPT | Performed by: EMERGENCY MEDICINE

## 2024-12-20 PROCEDURE — 93005 ELECTROCARDIOGRAM TRACING: CPT

## 2024-12-20 PROCEDURE — 84484 ASSAY OF TROPONIN QUANT: CPT | Performed by: EMERGENCY MEDICINE

## 2024-12-20 PROCEDURE — 99285 EMERGENCY DEPT VISIT HI MDM: CPT | Performed by: EMERGENCY MEDICINE

## 2024-12-20 PROCEDURE — 71045 X-RAY EXAM CHEST 1 VIEW: CPT

## 2024-12-20 PROCEDURE — 2500000001 HC RX 250 WO HCPCS SELF ADMINISTERED DRUGS (ALT 637 FOR MEDICARE OP): Performed by: EMERGENCY MEDICINE

## 2024-12-20 PROCEDURE — 83735 ASSAY OF MAGNESIUM: CPT | Performed by: EMERGENCY MEDICINE

## 2024-12-20 PROCEDURE — 71045 X-RAY EXAM CHEST 1 VIEW: CPT | Performed by: RADIOLOGY

## 2024-12-20 PROCEDURE — 85025 COMPLETE CBC W/AUTO DIFF WBC: CPT | Performed by: EMERGENCY MEDICINE

## 2024-12-20 PROCEDURE — 36415 COLL VENOUS BLD VENIPUNCTURE: CPT | Performed by: EMERGENCY MEDICINE

## 2024-12-20 RX ORDER — METOPROLOL SUCCINATE 25 MG/1
50 TABLET, EXTENDED RELEASE ORAL DAILY
Status: DISCONTINUED | OUTPATIENT
Start: 2024-12-20 | End: 2024-12-20

## 2024-12-20 RX ORDER — METOPROLOL SUCCINATE 25 MG/1
50 TABLET, EXTENDED RELEASE ORAL ONCE
Status: COMPLETED | OUTPATIENT
Start: 2024-12-20 | End: 2024-12-20

## 2024-12-20 RX ORDER — METOPROLOL SUCCINATE 50 MG/1
50 TABLET, EXTENDED RELEASE ORAL DAILY
Qty: 30 TABLET | Refills: 1 | Status: SHIPPED | OUTPATIENT
Start: 2024-12-20 | End: 2025-01-19

## 2024-12-20 ASSESSMENT — COLUMBIA-SUICIDE SEVERITY RATING SCALE - C-SSRS
6. HAVE YOU EVER DONE ANYTHING, STARTED TO DO ANYTHING, OR PREPARED TO DO ANYTHING TO END YOUR LIFE?: NO
1. IN THE PAST MONTH, HAVE YOU WISHED YOU WERE DEAD OR WISHED YOU COULD GO TO SLEEP AND NOT WAKE UP?: NO
2. HAVE YOU ACTUALLY HAD ANY THOUGHTS OF KILLING YOURSELF?: NO

## 2024-12-20 ASSESSMENT — PAIN SCALES - GENERAL: PAINLEVEL_OUTOF10: 4

## 2024-12-20 ASSESSMENT — PAIN DESCRIPTION - PAIN TYPE: TYPE: ACUTE PAIN

## 2024-12-20 ASSESSMENT — PAIN DESCRIPTION - LOCATION: LOCATION: SHOULDER

## 2024-12-20 ASSESSMENT — PAIN - FUNCTIONAL ASSESSMENT: PAIN_FUNCTIONAL_ASSESSMENT: 0-10

## 2024-12-20 ASSESSMENT — PAIN DESCRIPTION - ORIENTATION: ORIENTATION: LEFT

## 2024-12-20 NOTE — ED PROVIDER NOTES
HPI   Chief Complaint   Patient presents with    Rapid Heart Rate     Feels like his heart is racing and pain in top of left shoulder which started yesterday. Having a lot of burping as well.        69-year-old male with history of coronary artery disease status post PCI with stent placement, SVT, COPD, hypertension, hyperlipidemia presents for evaluation of palpitations.  Patient states that on the evening of 12/18 he had an episode of heart racing that lasted about 30 minutes when he was sitting in his chair and persisted for about 15 minutes when he laid down to go to sleep.  That episode resolved spontaneously.  Patient states yesterday 12/19 he had another subsequent episode of heart racing sensation that lasted couple minutes.  Patient states this morning he was drinking coffee and doing some work in his garage when he developed another episode of heart racing sensation lasting a few minutes.  He states that he has also had left shoulder pain for the past 3 days as well.  No lightheadedness falls or syncopal episodes.  He had a similar episode of SVT on September 30.  He was seen in the emergency department here by myself.  He converted to sinus rhythm with IV fluids only and his Cardizem was increased to 360 mg extended release daily.      History provided by:  Patient and medical records          Patient History   Past Medical History:   Diagnosis Date    Acute bronchitis due to other specified organisms 04/30/2014    Viral bronchitis    Arteriosclerosis of coronary artery     BPH (benign prostatic hyperplasia)     CAD (coronary artery disease)     Chondrocostal junction syndrome (tietze) 04/30/2014    Costochondritis    Chronic obstructive asthma (Multi)     Chronic sinusitis, unspecified     Chronic sinusitis    Deviated nasal septum     Acquired deviated nasal septum    Epigastric abdominal tenderness 01/25/2013    Abdominal tenderness, epigastric    HLD (hyperlipidemia)     HTN (hypertension)     Nasal  congestion     Nasal congestion    Nausea 09/13/2013    Nausea    OAB (overactive bladder)     Obstructive sleep apnea (adult) (pediatric)     does not use his CPAP    Other amnesia 09/16/2013    Memory loss    Other conditions influencing health status     Acute Otitis Externa Of The Left Ear    Other conditions influencing health status     Acute Mucoid Otitis Media Of The Left Ear    Other conditions influencing health status     Tympanic Membrane Perforation Of The Left Ear    Other conditions influencing health status     Arthritis    Other hypertrophic disorders of the skin 04/12/2016    Skin tag    Otitis media, unspecified, right ear 12/11/2017    Acute right otitis media    Pain in unspecified knee 09/10/2015    Knee pain    Pain in unspecified shoulder 10/01/2015    Shoulder pain    Pathological fracture, unspecified femur, initial encounter for fracture (Multi) 07/14/2015    Insufficiency fracture of medial femoral condyle    Personal history of other diseases of male genital organs 10/27/2016    History of acute prostatitis    Personal history of other diseases of the digestive system 10/19/2016    History of acute gastritis    Personal history of other diseases of the nervous system and sense organs 12/15/2017    History of acute otitis media    Personal history of other diseases of the nervous system and sense organs 12/11/2012    History of acute otitis externa    Personal history of other diseases of the nervous system and sense organs 12/11/2012    History of impacted cerumen    Personal history of other diseases of the respiratory system 10/21/2016    History of acute bronchitis    Personal history of other diseases of the respiratory system 01/12/2015    History of sinusitis    Personal history of other diseases of the respiratory system 02/15/2017    History of acute bronchitis    Personal history of other specified conditions 11/01/2016    History of shortness of breath    Unspecified injury of  right shoulder and upper arm, initial encounter 2015    Injury of shoulder, right     Past Surgical History:   Procedure Laterality Date    BLADDER SURGERY      Axonics stage 1    CORONARY ANGIOPLASTY WITH STENT PLACEMENT      2017    HERNIA REPAIR  2013    Hernia Repair-bilateral inguinal    KNEE ARTHROSCOPY W/ DEBRIDEMENT Left     for arthritis    LUMBAR SPINE SURGERY      microdiscectomy    TRANSURETHRAL RESECTION OF PROSTATE       Family History   Problem Relation Name Age of Onset    Coronary artery disease Mother      Other (htn) Mother      Lung cancer Father      Bone cancer Father       Social History     Tobacco Use    Smoking status: Former     Current packs/day: 0.00     Average packs/day: 2.5 packs/day for 25.0 years (62.5 ttl pk-yrs)     Types: Cigarettes     Start date:      Quit date:      Years since quittin.9    Smokeless tobacco: Never   Vaping Use    Vaping status: Never Used   Substance Use Topics    Alcohol use: Yes     Alcohol/week: 3.0 standard drinks of alcohol     Types: 3 Standard drinks or equivalent per week     Comment: Beers a few times per week    Drug use: Not Currently       Physical Exam   ED Triage Vitals [24 1141]   Temperature Heart Rate Respirations BP   36.3 °C (97.3 °F) 70 12 129/80      Pulse Ox Temp Source Heart Rate Source Patient Position   98 % Temporal -- --      BP Location FiO2 (%)     -- --       Physical Exam  Vitals and nursing note reviewed.   Constitutional:       General: He is not in acute distress.     Appearance: He is well-developed.   HENT:      Head: Normocephalic and atraumatic.   Eyes:      Conjunctiva/sclera: Conjunctivae normal.   Cardiovascular:      Rate and Rhythm: Normal rate and regular rhythm.      Pulses: Normal pulses.      Heart sounds: No murmur heard.  Pulmonary:      Effort: Pulmonary effort is normal. No respiratory distress.      Breath sounds: Normal breath sounds.   Abdominal:      Palpations: Abdomen is  soft.      Tenderness: There is no abdominal tenderness.   Musculoskeletal:         General: No swelling.      Cervical back: Neck supple.   Skin:     General: Skin is warm and dry.      Capillary Refill: Capillary refill takes less than 2 seconds.   Neurological:      General: No focal deficit present.      Mental Status: He is alert and oriented to person, place, and time.      Cranial Nerves: No cranial nerve deficit.      Sensory: No sensory deficit.      Motor: No weakness.      Coordination: Coordination normal.   Psychiatric:         Mood and Affect: Mood normal.           ED Course & MDM   ED Course as of 12/20/24 1536   Fri Dec 20, 2024   1157 ECG 12 lead  EKG interpreted by me shows sinus rhythm with sinus arrhythmia.  Rate = 74.  Left axis deviation.  No acute injury pattern. [BT]   1300 69-year-old male with history of SVT, coronary disease, COPD presents with heart palpitations.  Suspect recurrent episodes of nonsustained SVT.  He is currently in sinus rhythm with sinus arrhythmia.  Labs including serial troponins, TSH.  Telemetry monitoring. [BT]   1535 Troponins negative.  Electrolytes closely unremarkable. [BT]   1535 No cardiac dysrhythmias on telemetry. [BT]   1535 ECG 12 lead  Repeat EKG interpreted by me shows sinus rhythm with rate of 56 with sinus arrhythmia.  Left axis deviation.  No acute injury pattern. [BT]   1535 I spoke with cardiology Dr. Ramirez.  Patient is likely having paroxysmal nonsustained SVT.  He advised to start metoprolol 50 mg XL in addition to the Cardizem 360 mg extended release daily.  Plan will be elective SVT ablation.  Patient educated regarding vagal maneuvers at home.  Patient advised to return with sustained rapid heart rate over 1 hour.  He also was advised to return anytime with any concerns.  He agrees to this plan and verbalized understanding.  Discharged home. [BT]      ED Course User Index  [BT] Gomez Tony,          Diagnoses as of 12/20/24 1536    Palpitations   Acute pain of left shoulder   History of paroxysmal supraventricular tachycardia   Nonsustained paroxysmal supraventricular tachycardia (CMS-HCC)                 No data recorded     [place] Coma Scale Score: 15 (12/20/24 1444 : Garrett Benoit RN)                           Medical Decision Making      Procedure  Procedures     Gomez Tony,   12/20/24 1531

## 2024-12-26 LAB
ATRIAL RATE: 56 BPM
ATRIAL RATE: 74 BPM
P AXIS: 38 DEGREES
P AXIS: 45 DEGREES
P OFFSET: 184 MS
P OFFSET: 191 MS
P ONSET: 121 MS
P ONSET: 128 MS
PR INTERVAL: 166 MS
PR INTERVAL: 180 MS
Q ONSET: 211 MS
Q ONSET: 211 MS
QRS COUNT: 12 BEATS
QRS COUNT: 9 BEATS
QRS DURATION: 96 MS
QRS DURATION: 98 MS
QT INTERVAL: 390 MS
QT INTERVAL: 432 MS
QTC CALCULATION(BAZETT): 416 MS
QTC CALCULATION(BAZETT): 432 MS
QTC FREDERICIA: 418 MS
QTC FREDERICIA: 422 MS
R AXIS: -22 DEGREES
R AXIS: -28 DEGREES
T AXIS: 24 DEGREES
T AXIS: 38 DEGREES
T OFFSET: 406 MS
T OFFSET: 427 MS
VENTRICULAR RATE: 56 BPM
VENTRICULAR RATE: 74 BPM

## 2024-12-30 DIAGNOSIS — I47.10 PAROXYSMAL SUPRAVENTRICULAR TACHYCARDIA (CMS-HCC): ICD-10-CM

## 2024-12-30 RX ORDER — DILTIAZEM HYDROCHLORIDE 360 MG/1
360 CAPSULE, EXTENDED RELEASE ORAL DAILY
Qty: 90 CAPSULE | Refills: 1 | Status: SHIPPED | OUTPATIENT
Start: 2024-12-30 | End: 2025-06-28

## 2025-01-16 RX ORDER — EVOLOCUMAB 140 MG/ML
140 INJECTION, SOLUTION SUBCUTANEOUS
COMMUNITY

## 2025-01-20 ENCOUNTER — APPOINTMENT (OUTPATIENT)
Dept: CARDIOLOGY | Facility: CLINIC | Age: 70
End: 2025-01-20
Payer: MEDICARE

## 2025-01-20 VITALS
DIASTOLIC BLOOD PRESSURE: 80 MMHG | BODY MASS INDEX: 29.55 KG/M2 | HEIGHT: 68 IN | OXYGEN SATURATION: 97 % | WEIGHT: 195 LBS | SYSTOLIC BLOOD PRESSURE: 120 MMHG | HEART RATE: 61 BPM

## 2025-01-20 DIAGNOSIS — I25.10 CORONARY ARTERY DISEASE INVOLVING NATIVE CORONARY ARTERY OF NATIVE HEART WITHOUT ANGINA PECTORIS: Primary | ICD-10-CM

## 2025-01-20 DIAGNOSIS — E78.5 HYPERLIPIDEMIA, UNSPECIFIED HYPERLIPIDEMIA TYPE: ICD-10-CM

## 2025-01-20 DIAGNOSIS — E78.01 ESSENTIAL FAMILIAL HYPERCHOLESTEROLEMIA: ICD-10-CM

## 2025-01-20 DIAGNOSIS — I47.10 PAROXYSMAL SUPRAVENTRICULAR TACHYCARDIA (CMS-HCC): ICD-10-CM

## 2025-01-20 DIAGNOSIS — E78.5 DYSLIPIDEMIA: ICD-10-CM

## 2025-01-20 DIAGNOSIS — I10 PRIMARY HYPERTENSION: ICD-10-CM

## 2025-01-20 DIAGNOSIS — E78.5 DYSLIPIDEMIA: Primary | ICD-10-CM

## 2025-01-20 DIAGNOSIS — R00.2 PALPITATIONS: ICD-10-CM

## 2025-01-20 PROCEDURE — 1036F TOBACCO NON-USER: CPT | Performed by: INTERNAL MEDICINE

## 2025-01-20 PROCEDURE — 3074F SYST BP LT 130 MM HG: CPT | Performed by: INTERNAL MEDICINE

## 2025-01-20 PROCEDURE — 3079F DIAST BP 80-89 MM HG: CPT | Performed by: INTERNAL MEDICINE

## 2025-01-20 PROCEDURE — 99215 OFFICE O/P EST HI 40 MIN: CPT | Performed by: INTERNAL MEDICINE

## 2025-01-20 PROCEDURE — 3008F BODY MASS INDEX DOCD: CPT | Performed by: INTERNAL MEDICINE

## 2025-01-20 NOTE — PROGRESS NOTES
Primary Care Physician: Julienne Wilhelm MD        Date of Visit: 01/20/2025 10:00 AM EST  Location of visit:  [facility]   Type of Visit: Follow up        DIAGNOSES: PSVT. CAD Cath for angina 5/16/2017: Codominant system 3VD S/p Culprit vessel PCI to LAD/D1. Preserved LV systolic function. Negative stress test at 12 METs (3/26/2018). Hyperlipidemia. Statin intolerance. Ex smoker. Obesity. LIZ        Chief Complaint   Patient presents with    Follow-up     Follow up from ER for chest pressure and rapid heart beat        HPI / Summary:   Talon Leone is a 69 y.o. male  who returns for routine follow up.  He had had an episode of SVT with an ER visit on 12/20/2024.  On metoprolol and diltiazem he has not had any further episodes.  He otherwise feels well and remains active.      12 system review is negative except as noted above        Medical History:   Past Medical History:   Diagnosis Date    Acute bronchitis due to other specified organisms 04/30/2014    Viral bronchitis    Arteriosclerosis of coronary artery     BPH (benign prostatic hyperplasia)     CAD (coronary artery disease)     Chondrocostal junction syndrome (tietze) 04/30/2014    Costochondritis    Chronic obstructive asthma (Multi)     Chronic sinusitis, unspecified     Chronic sinusitis    Deviated nasal septum     Acquired deviated nasal septum    Epigastric abdominal tenderness 01/25/2013    Abdominal tenderness, epigastric    HLD (hyperlipidemia)     HTN (hypertension)     Nasal congestion     Nasal congestion    Nausea 09/13/2013    Nausea    OAB (overactive bladder)     Obstructive sleep apnea (adult) (pediatric)     does not use his CPAP    Other amnesia 09/16/2013    Memory loss    Other conditions influencing health status     Acute Otitis Externa Of The Left Ear    Other conditions influencing health status     Acute Mucoid Otitis Media Of The Left Ear    Other conditions influencing health status     Tympanic Membrane Perforation Of The  Left Ear    Other conditions influencing health status     Arthritis    Other hypertrophic disorders of the skin 04/12/2016    Skin tag    Otitis media, unspecified, right ear 12/11/2017    Acute right otitis media    Pain in unspecified knee 09/10/2015    Knee pain    Pain in unspecified shoulder 10/01/2015    Shoulder pain    Pathological fracture, unspecified femur, initial encounter for fracture (Multi) 07/14/2015    Insufficiency fracture of medial femoral condyle    Personal history of other diseases of male genital organs 10/27/2016    History of acute prostatitis    Personal history of other diseases of the digestive system 10/19/2016    History of acute gastritis    Personal history of other diseases of the nervous system and sense organs 12/15/2017    History of acute otitis media    Personal history of other diseases of the nervous system and sense organs 12/11/2012    History of acute otitis externa    Personal history of other diseases of the nervous system and sense organs 12/11/2012    History of impacted cerumen    Personal history of other diseases of the respiratory system 10/21/2016    History of acute bronchitis    Personal history of other diseases of the respiratory system 01/12/2015    History of sinusitis    Personal history of other diseases of the respiratory system 02/15/2017    History of acute bronchitis    Personal history of other specified conditions 11/01/2016    History of shortness of breath    Unspecified injury of right shoulder and upper arm, initial encounter 09/29/2015    Injury of shoulder, right       Social History:   Tobacco Use: Medium Risk (12/20/2024)    Patient History     Smoking Tobacco Use: Former     Smokeless Tobacco Use: Never     Passive Exposure: Not on file         MEDICATIONS:   Current Outpatient Medications   Medication Instructions    aspirin 81 mg, Daily    dilTIAZem CD (CARDIZEM CD) 360 mg, oral, Daily    fesoterodine 8 mg, oral, Daily    inclisiran  (LEQVIO) 284 mg, subcutaneous, Every 6 months, Inject 284/1.5    metoprolol succinate XL (TOPROL-XL) 50 mg, oral, Daily, Do not crush or chew.    nitroglycerin (NITROSTAT) 0.4 mg, Every 5 min PRN    omeprazole (PRILOSEC) 20 mg, oral, Daily, Do not crush or chew.    Repatha SureClick 140 mg    sildenafil (VIAGRA) 100 mg, oral, Daily PRN           LABS:    CBC with Differential:    Lab Results   Component Value Date    WBC 6.5 12/20/2024    RBC 5.29 12/20/2024    HGB 15.9 12/20/2024    HCT 47.0 12/20/2024     12/20/2024    MCV 89 12/20/2024    MCH 30.1 12/20/2024    MCHC 33.8 12/20/2024    RDW 12.3 12/20/2024    NRBC 0.0 12/20/2024    LYMPHOPCT 43.3 12/20/2024    MONOPCT 6.9 12/20/2024    EOSPCT 2.8 12/20/2024    BASOPCT 0.3 12/20/2024    MONOSABS 0.45 12/20/2024    LYMPHSABS 2.81 12/20/2024    EOSABS 0.18 12/20/2024    BASOSABS 0.02 12/20/2024     CMP:    Lab Results   Component Value Date     12/20/2024    K 3.9 12/20/2024     12/20/2024    CO2 30 12/20/2024    BUN 18 12/20/2024    CREATININE 0.83 12/20/2024    GLUCOSE 88 12/20/2024    PROT 6.8 12/20/2024    CALCIUM 9.6 12/20/2024    BILITOT 0.5 12/20/2024    ALKPHOS 93 12/20/2024    AST 17 12/20/2024    ALT 20 12/20/2024     BMP:    Lab Results   Component Value Date     12/20/2024    K 3.9 12/20/2024     12/20/2024    CO2 30 12/20/2024    BUN 18 12/20/2024    CREATININE 0.83 12/20/2024    CALCIUM 9.6 12/20/2024    GLUCOSE 88 12/20/2024     Magnesium:  Lab Results   Component Value Date    MG 2.10 12/20/2024     Troponin:    Lab Results   Component Value Date    TROPHS <3 12/20/2024    TROPHS <3 12/20/2024    TROPHS 15 09/30/2024     BNP:   Lab Results   Component Value Date    BNP 13 12/14/2024         Lipid Panel:  Lab Results   Component Value Date    HDL 50.4 12/14/2024    CHHDL 3.1 12/14/2024    VLDL 15 12/14/2024    TRIG 76 12/14/2024    NHDL 104 12/14/2024        Lab work and imaging results independently reviewed by me  "    Visit Vitals  /80   Pulse 61   Ht 1.727 m (5' 8\")   Wt 88.5 kg (195 lb)   SpO2 97%   BMI 29.65 kg/m²   Smoking Status Former   BSA 2.06 m²         Physical Exam  On examination, he was comfortably sitting.  HEENT: normal, Neck: supple, Carotids: brisk upstroke, JVP: normal, CVS: Rate and rhythm regular, S1S2, Chest: CTAB, Abdomen: soft nontender, no organomegaly appreciated, Extremities: without any edema, CNS: HMF normal, no focal neurological deficit.    DIAGNOSES: PSVT. CAD Cath for angina 5/16/2017: Codominant system 3VD S/p Culprit vessel PCI to LAD/D1. Preserved LV systolic function. Negative stress test at 12 METs (3/26/2018). Hyperlipidemia. Statin intolerance. Ex smoker. Obesity. LIZ     I had a detailed discussion with him regarding the benign nature of paroxysmal supraventricular tachycardia and if he has no further episodes on the current medication, he does not necessarily require any ablation procedure.  Both beta-blockers and diltiazem will be beneficial for him for his CAD as well.  In case he has any recurrent PSVT while on this medication, definitely will consider ablation.  I encouraged him to continue with medications as prescribed, along with the positive lifestyle changes and dietary modifications to lose weight.    Thank you so much for the opportunity to participate in the care of this very pleasant gentleman. Please do not hesitate to contact me if I could be of any assistance in his future care.    Sincerely        Trey Ramirez M.D.            01/20/25 at 11:23 AM - Jorge Ramirez MD      Orders:  No orders of the defined types were placed in this encounter.        Followup Appts:  Future Appointments   Date Time Provider Department Center   2/4/2025  8:00 AM INF 05 [id number]   4/10/2025  8:30 AM Julienne Wilhelm MD [id number] [facility]   7/21/2025  9:40 AM Jorge Ramirez MD [id number] [facility]      "

## 2025-01-27 ENCOUNTER — APPOINTMENT (OUTPATIENT)
Dept: CARDIOLOGY | Facility: CLINIC | Age: 70
End: 2025-01-27
Payer: COMMERCIAL

## 2025-02-04 ENCOUNTER — INFUSION (OUTPATIENT)
Dept: HEMATOLOGY/ONCOLOGY | Facility: HOSPITAL | Age: 70
End: 2025-02-04
Payer: MEDICARE

## 2025-02-04 VITALS
RESPIRATION RATE: 16 BRPM | SYSTOLIC BLOOD PRESSURE: 127 MMHG | HEART RATE: 56 BPM | TEMPERATURE: 96.3 F | WEIGHT: 206.13 LBS | DIASTOLIC BLOOD PRESSURE: 80 MMHG | OXYGEN SATURATION: 96 % | BODY MASS INDEX: 32.35 KG/M2 | HEIGHT: 67 IN

## 2025-02-04 DIAGNOSIS — E78.5 DYSLIPIDEMIA: ICD-10-CM

## 2025-02-04 DIAGNOSIS — E78.5 HYPERLIPIDEMIA, UNSPECIFIED HYPERLIPIDEMIA TYPE: ICD-10-CM

## 2025-02-04 PROCEDURE — 96372 THER/PROPH/DIAG INJ SC/IM: CPT

## 2025-02-04 PROCEDURE — 2500000004 HC RX 250 GENERAL PHARMACY W/ HCPCS (ALT 636 FOR OP/ED): Mod: JZ,TB | Performed by: NURSE PRACTITIONER

## 2025-02-04 RX ORDER — ALBUTEROL SULFATE 0.83 MG/ML
3 SOLUTION RESPIRATORY (INHALATION) AS NEEDED
OUTPATIENT
Start: 2025-05-07

## 2025-02-04 RX ORDER — EPINEPHRINE 0.3 MG/.3ML
0.3 INJECTION SUBCUTANEOUS EVERY 5 MIN PRN
OUTPATIENT
Start: 2025-05-07

## 2025-02-04 RX ORDER — DIPHENHYDRAMINE HYDROCHLORIDE 50 MG/ML
50 INJECTION INTRAMUSCULAR; INTRAVENOUS AS NEEDED
OUTPATIENT
Start: 2025-05-07

## 2025-02-04 RX ORDER — FAMOTIDINE 10 MG/ML
20 INJECTION INTRAVENOUS ONCE AS NEEDED
OUTPATIENT
Start: 2025-05-07

## 2025-02-04 RX ADMIN — INCLISIRAN 284 MG: 284 INJECTION, SOLUTION SUBCUTANEOUS at 08:21

## 2025-02-04 ASSESSMENT — PAIN SCALES - GENERAL: PAINLEVEL_OUTOF10: 0-NO PAIN

## 2025-02-04 ASSESSMENT — PATIENT HEALTH QUESTIONNAIRE - PHQ9
SUM OF ALL RESPONSES TO PHQ9 QUESTIONS 1 & 2: 0
1. LITTLE INTEREST OR PLEASURE IN DOING THINGS: NOT AT ALL
2. FEELING DOWN, DEPRESSED OR HOPELESS: NOT AT ALL

## 2025-02-04 ASSESSMENT — ENCOUNTER SYMPTOMS
OCCASIONAL FEELINGS OF UNSTEADINESS: 0
DEPRESSION: 0
LOSS OF SENSATION IN FEET: 0

## 2025-02-17 DIAGNOSIS — R00.2 PALPITATIONS: ICD-10-CM

## 2025-02-17 DIAGNOSIS — I47.10 NONSUSTAINED PAROXYSMAL SUPRAVENTRICULAR TACHYCARDIA (CMS-HCC): ICD-10-CM

## 2025-02-17 RX ORDER — METOPROLOL SUCCINATE 50 MG/1
50 TABLET, EXTENDED RELEASE ORAL DAILY
Qty: 90 TABLET | Refills: 1 | Status: SHIPPED | OUTPATIENT
Start: 2025-02-17 | End: 2025-08-16

## 2025-03-14 ENCOUNTER — APPOINTMENT (OUTPATIENT)
Dept: CARDIOLOGY | Facility: CLINIC | Age: 70
End: 2025-03-14
Payer: COMMERCIAL

## 2025-04-10 ENCOUNTER — APPOINTMENT (OUTPATIENT)
Dept: PRIMARY CARE | Facility: CLINIC | Age: 70
End: 2025-04-10
Payer: MEDICARE

## 2025-04-10 VITALS
SYSTOLIC BLOOD PRESSURE: 110 MMHG | HEIGHT: 68 IN | HEART RATE: 58 BPM | OXYGEN SATURATION: 97 % | BODY MASS INDEX: 31.74 KG/M2 | TEMPERATURE: 97.1 F | DIASTOLIC BLOOD PRESSURE: 68 MMHG | WEIGHT: 209.4 LBS

## 2025-04-10 DIAGNOSIS — E55.9 VITAMIN D DEFICIENCY, UNSPECIFIED: ICD-10-CM

## 2025-04-10 DIAGNOSIS — K44.9 HIATAL HERNIA: ICD-10-CM

## 2025-04-10 DIAGNOSIS — E78.00 HYPERCHOLESTEREMIA: ICD-10-CM

## 2025-04-10 DIAGNOSIS — N40.0 BENIGN PROSTATIC HYPERPLASIA, UNSPECIFIED WHETHER LOWER URINARY TRACT SYMPTOMS PRESENT: ICD-10-CM

## 2025-04-10 DIAGNOSIS — R10.13 EPIGASTRIC ABDOMINAL PAIN: ICD-10-CM

## 2025-04-10 DIAGNOSIS — Z12.5 SCREENING FOR PROSTATE CANCER: ICD-10-CM

## 2025-04-10 DIAGNOSIS — E53.8 VITAMIN B12 DEFICIENCY: ICD-10-CM

## 2025-04-10 DIAGNOSIS — I10 HYPERTENSION, UNSPECIFIED TYPE: ICD-10-CM

## 2025-04-10 DIAGNOSIS — M70.52 PES ANSERINUS BURSITIS OF LEFT KNEE: ICD-10-CM

## 2025-04-10 DIAGNOSIS — Z98.61 S/P PTCA (PERCUTANEOUS TRANSLUMINAL CORONARY ANGIOPLASTY): ICD-10-CM

## 2025-04-10 DIAGNOSIS — I47.10 SVT (SUPRAVENTRICULAR TACHYCARDIA) (CMS-HCC): ICD-10-CM

## 2025-04-10 DIAGNOSIS — Z00.00 ROUTINE GENERAL MEDICAL EXAMINATION AT HEALTH CARE FACILITY: Primary | ICD-10-CM

## 2025-04-10 PROCEDURE — 1036F TOBACCO NON-USER: CPT | Performed by: INTERNAL MEDICINE

## 2025-04-10 PROCEDURE — 1159F MED LIST DOCD IN RCRD: CPT | Performed by: INTERNAL MEDICINE

## 2025-04-10 PROCEDURE — 1160F RVW MEDS BY RX/DR IN RCRD: CPT | Performed by: INTERNAL MEDICINE

## 2025-04-10 PROCEDURE — G0439 PPPS, SUBSEQ VISIT: HCPCS | Performed by: INTERNAL MEDICINE

## 2025-04-10 PROCEDURE — 3078F DIAST BP <80 MM HG: CPT | Performed by: INTERNAL MEDICINE

## 2025-04-10 PROCEDURE — 3074F SYST BP LT 130 MM HG: CPT | Performed by: INTERNAL MEDICINE

## 2025-04-10 PROCEDURE — 99214 OFFICE O/P EST MOD 30 MIN: CPT | Performed by: INTERNAL MEDICINE

## 2025-04-10 PROCEDURE — 20610 DRAIN/INJ JOINT/BURSA W/O US: CPT | Performed by: INTERNAL MEDICINE

## 2025-04-10 PROCEDURE — 1170F FXNL STATUS ASSESSED: CPT | Performed by: INTERNAL MEDICINE

## 2025-04-10 PROCEDURE — 3008F BODY MASS INDEX DOCD: CPT | Performed by: INTERNAL MEDICINE

## 2025-04-10 PROCEDURE — 1124F ACP DISCUSS-NO DSCNMKR DOCD: CPT | Performed by: INTERNAL MEDICINE

## 2025-04-10 RX ORDER — OMEPRAZOLE 20 MG/1
20 CAPSULE, DELAYED RELEASE ORAL DAILY
Qty: 90 CAPSULE | Refills: 1 | Status: SHIPPED | OUTPATIENT
Start: 2025-04-10 | End: 2025-10-07

## 2025-04-10 RX ORDER — TRIAMCINOLONE ACETONIDE 40 MG/ML
40 INJECTION, SUSPENSION INTRA-ARTICULAR; INTRAMUSCULAR ONCE
Status: COMPLETED | OUTPATIENT
Start: 2025-04-10 | End: 2025-04-10

## 2025-04-10 RX ADMIN — TRIAMCINOLONE ACETONIDE 40 MG: 40 INJECTION, SUSPENSION INTRA-ARTICULAR; INTRAMUSCULAR at 09:25

## 2025-04-10 ASSESSMENT — PATIENT HEALTH QUESTIONNAIRE - PHQ9
SUM OF ALL RESPONSES TO PHQ9 QUESTIONS 1 AND 2: 0
2. FEELING DOWN, DEPRESSED OR HOPELESS: NOT AT ALL
1. LITTLE INTEREST OR PLEASURE IN DOING THINGS: NOT AT ALL

## 2025-04-10 ASSESSMENT — ACTIVITIES OF DAILY LIVING (ADL)
TAKING_MEDICATION: INDEPENDENT
DOING_HOUSEWORK: INDEPENDENT
DRESSING: INDEPENDENT
MANAGING_FINANCES: INDEPENDENT
GROCERY_SHOPPING: INDEPENDENT
BATHING: INDEPENDENT

## 2025-04-10 ASSESSMENT — ENCOUNTER SYMPTOMS
UNEXPECTED WEIGHT CHANGE: 0
BRUISES/BLEEDS EASILY: 0
DIZZINESS: 0
ARTHRALGIAS: 1
DIARRHEA: 0
WHEEZING: 0
PALPITATIONS: 0
FATIGUE: 0
SINUS PAIN: 0
COUGH: 0
HEADACHES: 0
BLOOD IN STOOL: 0
DIFFICULTY URINATING: 0
SORE THROAT: 0
FEVER: 0
ABDOMINAL PAIN: 0

## 2025-04-10 NOTE — PROGRESS NOTES
"Subjective   Patient ID: Talon Leone is a 69 y.o. male who presents for Medicare Annual Wellness Visit Subsequent.    HPI       Review of Systems    Objective   Lab Results   Component Value Date    HGBA1C 4.9 12/14/2024      /68   Pulse 58   Temp 36.2 °C (97.1 °F)   Ht 1.727 m (5' 8\")   Wt 95 kg (209 lb 6.4 oz)   SpO2 97%   BMI 31.84 kg/m²     Physical Exam    Assessment/Plan   Talon was seen today for medicare annual wellness visit subsequent.  Diagnoses and all orders for this visit:  Epigastric abdominal pain  Hiatal hernia     "

## 2025-04-10 NOTE — PROGRESS NOTES
Subjective   Reason for Visit: Talon Leone is an 69 y.o. male here for a Medicare Wellness visit.     Past Medical, Surgical, and Family History reviewed and updated in chart.    Reviewed all medications by prescribing practitioner or clinical pharmacist (such as prescriptions, OTCs, herbal therapies and supplements) and documented in the medical record.    Annual preventive visit  -Vaccinations reviewed  Needs a booster for pneumonia vaccine tetanus vaccine  - Colon cancer obtained repeat in July 2028  - Screen for depression negative  - Advanced directive reviewed    Follow-up  Recent blood work reviewed and emergency room records reviewed with patient labs and CT scan results reviewed  -Left knee pain anserine bursitis, steroid injection today follow-up results closely  --Paroxysmal supraventricular tachycardia patient to continue on current dose echocardiogram 360 mg daily follow-up cardiology as a scheduled with Dr. Ramirez  -CT showed small hiatal hernia continue with proton pump inhibitor until reevaluation in 3 months counseled about meal size and diet control  -Erectile dysfunction patient may try sildenafil 100 mg given instruction about use and side effect  - Screening for colon cancer up-to-date repeat done in 2020 3 repeat in July 2028 Dr. Delcid  - Recent blood work reviewed and up-to-date  -- Patient need to avoid any NSAIDs due to underlying coronary artery disease  - - Hypercholesterolemia patient underlying- Statin intolerance continue on  Leqvic an outpatient in [place]   - Benign prostatic hypertrophy symptoms improving now continue with current medication  - Coronary artery disease compensated continue with aspirin daily  - COPD compensated on Breo as needed doing well  Follow-up 3 months             Patient Care Team:  Julienne Wilhelm MD as PCP - General (Internal Medicine)  Julienne Wilhelm MD as PCP - [facility] Attributed Provider  Julienne Wilhelm MD     Review of Systems   Constitutional:   "Negative for fatigue, fever and unexpected weight change.   HENT:  Negative for congestion, ear discharge, ear pain, mouth sores, sinus pain and sore throat.    Eyes:  Negative for visual disturbance.   Respiratory:  Negative for cough and wheezing.    Cardiovascular:  Negative for chest pain, palpitations and leg swelling.   Gastrointestinal:  Negative for abdominal pain, blood in stool and diarrhea.   Genitourinary:  Negative for difficulty urinating.   Musculoskeletal:  Positive for arthralgias.   Skin:  Negative for rash.   Neurological:  Negative for dizziness and headaches.   Hematological:  Does not bruise/bleed easily.   Psychiatric/Behavioral:  Negative for behavioral problems.    All other systems reviewed and are negative.      Objective   Vitals:  /68   Pulse 58   Temp 36.2 °C (97.1 °F)   Ht 1.727 m (5' 8\")   Wt 95 kg (209 lb 6.4 oz)   SpO2 97%   BMI 31.84 kg/m²     Lab Results   Component Value Date    WBC 6.5 12/20/2024    HGB 15.9 12/20/2024    HCT 47.0 12/20/2024     12/20/2024    CHOL 154 12/14/2024    TRIG 76 12/14/2024    HDL 50.4 12/14/2024    ALT 20 12/20/2024    AST 17 12/20/2024     12/20/2024    K 3.9 12/20/2024     12/20/2024    CREATININE 0.83 12/20/2024    BUN 18 12/20/2024    CO2 30 12/20/2024    TSH 1.79 12/20/2024    PSA 3.0 03/21/2018    INR 0.9 05/19/2021    HGBA1C 4.9 12/14/2024     par   Physical Exam  Vitals and nursing note reviewed.   Constitutional:       Appearance: Normal appearance.   HENT:      Head: Normocephalic.      Nose: Nose normal.   Eyes:      Conjunctiva/sclera: Conjunctivae normal.      Pupils: Pupils are equal, round, and reactive to light.   Cardiovascular:      Rate and Rhythm: Regular rhythm.   Pulmonary:      Effort: Pulmonary effort is normal.      Breath sounds: Normal breath sounds.   Abdominal:      General: Abdomen is flat.      Palpations: Abdomen is soft.   Musculoskeletal:         General: Tenderness (Left knee anserine " bursitis) present.      Cervical back: Neck supple.   Skin:     General: Skin is warm.   Neurological:      General: No focal deficit present.      Mental Status: He is oriented to person, place, and time.   Psychiatric:         Mood and Affect: Mood normal.       Procedure note  Left knee anserine bursa injection  After patient verbal consent, risks and benefits of procedure explained, including infection bleeding, patient understood  Ethyl alcohol used for sterilization  Ethyl chloride spray used for  local anesthesia  40 mg Kenalog  and 1 cc lidocaine injected to left anserine bursa            , used  1 inch needle  Patient tolerated procedure,   No complications   Band aid used for dressing  Patient instructed no heavy exertion or exercise for the next 48 hours  Assessment & Plan  Epigastric abdominal pain    Orders:    omeprazole (PriLOSEC) 20 mg DR capsule; Take 1 capsule (20 mg) by mouth once daily. Do not crush or chew.    Hiatal hernia    Orders:    omeprazole (PriLOSEC) 20 mg DR capsule; Take 1 capsule (20 mg) by mouth once daily. Do not crush or chew.    Routine general medical examination at health care facility    Orders:    1 Year Follow Up In Primary Care - Wellness Exam; Future    Hepatitis C antibody; Future    pneumoc 20-jim conj-dip cr,PF, (Prevnar) 0.5 mL vaccine; Inject 0.5 mL into the muscle 1 time for 1 dose. Inject intramuscular x 1    diphth,pertus,acell,,tetanus (BoostRIX) 2.5-8-5 Lf-mcg-Lf/0.5mL injection; Inject 0.5 mL into the muscle 1 time for 1 dose. Inject intramuscular once    Screening for prostate cancer    Orders:    Prostate Specific Antigen, Screen; Future    Pes anserinus bursitis of left knee    Orders:    triamcinolone acetonide (Kenalog-40) injection 40 mg    SVT (supraventricular tachycardia) (CMS-[facility])         S/P PTCA (percutaneous transluminal coronary angioplasty)         Benign prostatic hyperplasia, unspecified whether lower urinary tract symptoms present          Hypercholesteremia         Vitamin B12 deficiency         Vitamin D deficiency, unspecified         Hypertension, unspecified type    Annual preventive visit  -Vaccinations reviewed  Needs a booster for pneumonia vaccine tetanus vaccine  - Colon cancer obtained repeat in July 2028  - Screen for depression negative  - Advanced directive reviewed    Follow-up  Recent blood work reviewed and emergency room records reviewed with patient labs and CT scan results reviewed  -Left knee pain anserine bursitis, steroid injection today follow-up results closely  --Paroxysmal supraventricular tachycardia patient to continue on current dose echocardiogram 360 mg daily follow-up cardiology as a scheduled with Dr. Ramirez  -CT showed small hiatal hernia continue with proton pump inhibitor until reevaluation in 3 months counseled about meal size and diet control  -Erectile dysfunction patient may try sildenafil 100 mg given instruction about use and side effect  - Screening for colon cancer up-to-date repeat done in 2020 3 repeat in July 2028 Dr. Delcid  - Recent blood work reviewed and up-to-date  -- Patient need to avoid any NSAIDs due to underlying coronary artery disease  - - Hypercholesterolemia patient underlying- Statin intolerance continue on  Leqvic an outpatient in [place]   - Benign prostatic hypertrophy symptoms improving now continue with current medication  - Coronary artery disease compensated continue with aspirin daily  - COPD compensated on Breo as needed doing well  Follow-up 3 months

## 2025-04-10 NOTE — ASSESSMENT & PLAN NOTE
Annual preventive visit  -Vaccinations reviewed  Needs a booster for pneumonia vaccine tetanus vaccine  - Colon cancer obtained repeat in July 2028  - Screen for depression negative  - Advanced directive reviewed    Follow-up  Recent blood work reviewed and emergency room records reviewed with patient labs and CT scan results reviewed  -Left knee pain anserine bursitis, steroid injection today follow-up results closely  --Paroxysmal supraventricular tachycardia patient to continue on current dose echocardiogram 360 mg daily follow-up cardiology as a scheduled with Dr. Ramirez  -CT showed small hiatal hernia continue with proton pump inhibitor until reevaluation in 3 months counseled about meal size and diet control  -Erectile dysfunction patient may try sildenafil 100 mg given instruction about use and side effect  - Screening for colon cancer up-to-date repeat done in 2020 3 repeat in July 2028 Dr. Delcid  - Recent blood work reviewed and up-to-date  -- Patient need to avoid any NSAIDs due to underlying coronary artery disease  - - Hypercholesterolemia patient underlying- Statin intolerance continue on  Leqvic an outpatient in [place]   - Benign prostatic hypertrophy symptoms improving now continue with current medication  - Coronary artery disease compensated continue with aspirin daily  - COPD compensated on Breo as needed doing well  Follow-up 3 months

## 2025-04-12 LAB
HCV AB SERPL QL IA: NORMAL
PSA SERPL-MCNC: 1.26 NG/ML

## 2025-06-22 DIAGNOSIS — I47.10 PAROXYSMAL SUPRAVENTRICULAR TACHYCARDIA: ICD-10-CM

## 2025-06-23 RX ORDER — DILTIAZEM HYDROCHLORIDE 360 MG/1
360 CAPSULE, EXTENDED RELEASE ORAL DAILY
Qty: 90 CAPSULE | Refills: 1 | Status: SHIPPED | OUTPATIENT
Start: 2025-06-23

## 2025-07-02 DIAGNOSIS — N32.81 OAB (OVERACTIVE BLADDER): ICD-10-CM

## 2025-07-09 RX ORDER — FESOTERODINE FUMARATE 8 MG/1
1 TABLET, FILM COATED, EXTENDED RELEASE ORAL DAILY
Qty: 90 TABLET | Refills: 0 | Status: SHIPPED | OUTPATIENT
Start: 2025-07-09

## 2025-07-21 ENCOUNTER — APPOINTMENT (OUTPATIENT)
Dept: CARDIOLOGY | Facility: CLINIC | Age: 70
End: 2025-07-21
Payer: COMMERCIAL

## 2025-07-21 VITALS
HEIGHT: 68 IN | WEIGHT: 202 LBS | DIASTOLIC BLOOD PRESSURE: 75 MMHG | BODY MASS INDEX: 30.62 KG/M2 | HEART RATE: 57 BPM | SYSTOLIC BLOOD PRESSURE: 117 MMHG | OXYGEN SATURATION: 97 %

## 2025-07-21 DIAGNOSIS — E78.2 MIXED HYPERLIPIDEMIA: ICD-10-CM

## 2025-07-21 DIAGNOSIS — I25.10 CORONARY ARTERY DISEASE INVOLVING NATIVE CORONARY ARTERY OF NATIVE HEART WITHOUT ANGINA PECTORIS: Primary | ICD-10-CM

## 2025-07-21 DIAGNOSIS — I10 PRIMARY HYPERTENSION: ICD-10-CM

## 2025-07-21 DIAGNOSIS — R00.2 PALPITATIONS: ICD-10-CM

## 2025-07-21 PROCEDURE — 3008F BODY MASS INDEX DOCD: CPT | Performed by: INTERNAL MEDICINE

## 2025-07-21 PROCEDURE — 1159F MED LIST DOCD IN RCRD: CPT | Performed by: INTERNAL MEDICINE

## 2025-07-21 PROCEDURE — 3074F SYST BP LT 130 MM HG: CPT | Performed by: INTERNAL MEDICINE

## 2025-07-21 PROCEDURE — 99215 OFFICE O/P EST HI 40 MIN: CPT | Performed by: INTERNAL MEDICINE

## 2025-07-21 PROCEDURE — 3078F DIAST BP <80 MM HG: CPT | Performed by: INTERNAL MEDICINE

## 2025-07-23 NOTE — PROGRESS NOTES
Primary Care Physician: Julienne Wilhelm MD        Date of Visit: 07/21/2025  9:40 AM EDT  Location of visit:  [facility]   Type of Visit: Follow up        Dear Dr Wilhelm,     DIAGNOSES: PSVT. CAD Cath for angina 5/16/2017: Codominant system 3VD S/p Culprit vessel PCI to LAD/D1. Preserved LV systolic function. Negative stress test at 12 METs (3/26/2018). Hyperlipidemia. Statin intolerance. Ex smoker. Obesity. LIZ     HPI / Summary:   Talon Leone is a 70 y.o. male who returns for routine follow up.  He remains active without any cardiac symptoms.      12 system review is negative except as noted above        Medical History:   Medical History[1]    Social History:   Tobacco Use: Medium Risk (7/21/2025)    Patient History     Smoking Tobacco Use: Former     Smokeless Tobacco Use: Never     Passive Exposure: Not on file         MEDICATIONS:   Current Outpatient Medications   Medication Instructions    aspirin 81 mg, Daily    dilTIAZem CD (CARDIZEM CD) 360 mg, oral, Daily    fesoterodine 8 mg, oral, Daily    inclisiran (LEQVIO) 284 mg, Every 6 months    metoprolol succinate XL (TOPROL-XL) 50 mg, oral, Daily, Do not crush or chew.    nitroglycerin (NITROSTAT) 0.4 mg, Every 5 min PRN    omeprazole (PRILOSEC) 20 mg, oral, Daily, Do not crush or chew.    sildenafil (VIAGRA) 100 mg, oral, Daily PRN         IMAGING REPORTS INDEPENDENTLY REVIEWED:           LABS:    CBC with Differential:    Lab Results   Component Value Date    WBC 6.5 12/20/2024    RBC 5.29 12/20/2024    HGB 15.9 12/20/2024    HCT 47.0 12/20/2024     12/20/2024    MCV 89 12/20/2024    MCH 30.1 12/20/2024    MCHC 33.8 12/20/2024    RDW 12.3 12/20/2024    NRBC 0.0 12/20/2024    LYMPHOPCT 43.3 12/20/2024    MONOPCT 6.9 12/20/2024    EOSPCT 2.8 12/20/2024    BASOPCT 0.3 12/20/2024    MONOSABS 0.45 12/20/2024    LYMPHSABS 2.81 12/20/2024    EOSABS 0.18 12/20/2024    BASOSABS 0.02 12/20/2024     CMP:    Lab Results   Component Value Date      "12/20/2024    K 3.9 12/20/2024     12/20/2024    CO2 30 12/20/2024    BUN 18 12/20/2024    CREATININE 0.83 12/20/2024    GLUCOSE 88 12/20/2024    PROT 6.8 12/20/2024    CALCIUM 9.6 12/20/2024    BILITOT 0.5 12/20/2024    ALKPHOS 93 12/20/2024    AST 17 12/20/2024    ALT 20 12/20/2024     BMP:    Lab Results   Component Value Date     12/20/2024    K 3.9 12/20/2024     12/20/2024    CO2 30 12/20/2024    BUN 18 12/20/2024    CREATININE 0.83 12/20/2024    CALCIUM 9.6 12/20/2024    GLUCOSE 88 12/20/2024     Magnesium:  Lab Results   Component Value Date    MG 2.10 12/20/2024     Troponin:    Lab Results   Component Value Date    TROPHS <3 12/20/2024    TROPHS <3 12/20/2024    TROPHS 15 09/30/2024     BNP:   Lab Results   Component Value Date    BNP 13 12/14/2024         Lipid Panel:  Lab Results   Component Value Date    HDL 50.4 12/14/2024    CHHDL 3.1 12/14/2024    VLDL 15 12/14/2024    TRIG 76 12/14/2024    NHDL 104 12/14/2024    LDLCALC 88 12/14/2024        Lab work and imaging results independently reviewed by me     Visit Vitals  /75   Pulse 57   Ht 1.727 m (5' 8\")   Wt 91.6 kg (202 lb)   SpO2 97%   BMI 30.71 kg/m²   Smoking Status Former   BSA 2.1 m²         On examination, patient is comfortably sitting.  HEENT: normal, Neck: supple, Carotids: brisk upstroke, JVP: normal, CVS: Rate and rhythm regular, S1S2, Chest: CTAB, Abdomen: soft nontender, no organomegaly appreciated, Extremities: without any edema, CNS: HMF normal, no focal neurological deficit.    DIAGNOSES: PSVT. CAD Cath for angina 5/16/2017: Codominant system 3VD S/p Culprit vessel PCI to LAD/D1. Preserved LV systolic function. Negative stress test at 12 METs (3/26/2018). Hyperlipidemia. Statin intolerance. Ex smoker. Obesity. LIZ     I am pleased to see that Talon has been doing well from a cardiac perspective.  I encouraged him to continue with the positive lifestyle changes and dietary modifications to lose weight.  I " reassured him from a cardiac perspective and advised to continue with all normal activities as tolerated, medications as prescribed and regular follow-up with you.    Thank you so much for the opportunity to participate in the care of this very pleasant . Please do not hesitate to contact me if I could be of any assistance in future care.    Sincerely        Trey Ramirez M.D.    07/22/25 at 10:01 PM - Jorge Ramirze MD      Orders:  No orders of the defined types were placed in this encounter.        Followup Appts:  Future Appointments   Date Time Provider Department Center   8/4/2025  8:30 AM INF 03 [facility]   10/9/2025  8:00 AM Julienne Wilhelm MD [id number] [facility]   2/2/2026  8:30 AM INF 03 [facility]   7/20/2026  9:20 AM Jorge Ramirez MD [id number] [facility]          [1]   Past Medical History:  Diagnosis Date    Acute bronchitis due to other specified organisms 04/30/2014    Viral bronchitis    Arteriosclerosis of coronary artery     BPH (benign prostatic hyperplasia)     CAD (coronary artery disease)     Chondrocostal junction syndrome (tietze) 04/30/2014    Costochondritis    Chronic obstructive asthma (Multi)     Chronic sinusitis, unspecified     Chronic sinusitis    Deviated nasal septum     Acquired deviated nasal septum    Epigastric abdominal tenderness 01/25/2013    Abdominal tenderness, epigastric    HLD (hyperlipidemia)     HTN (hypertension)     Nasal congestion     Nasal congestion    Nausea 09/13/2013    Nausea    OAB (overactive bladder)     Obstructive sleep apnea (adult) (pediatric)     does not use his CPAP    Other amnesia 09/16/2013    Memory loss    Other conditions influencing health status     Acute Otitis Externa Of The Left Ear    Other conditions influencing health status     Acute Mucoid Otitis Media Of The Left Ear    Other conditions influencing health status     Tympanic Membrane Perforation Of The Left Ear    Other conditions  influencing health status     Arthritis    Other hypertrophic disorders of the skin 04/12/2016    Skin tag    Otitis media, unspecified, right ear 12/11/2017    Acute right otitis media    Pain in unspecified knee 09/10/2015    Knee pain    Pain in unspecified shoulder 10/01/2015    Shoulder pain    Pathological fracture, unspecified femur, initial encounter for fracture (Multi) 07/14/2015    Insufficiency fracture of medial femoral condyle    Personal history of other diseases of male genital organs 10/27/2016    History of acute prostatitis    Personal history of other diseases of the digestive system 10/19/2016    History of acute gastritis    Personal history of other diseases of the nervous system and sense organs 12/15/2017    History of acute otitis media    Personal history of other diseases of the nervous system and sense organs 12/11/2012    History of acute otitis externa    Personal history of other diseases of the nervous system and sense organs 12/11/2012    History of impacted cerumen    Personal history of other diseases of the respiratory system 10/21/2016    History of acute bronchitis    Personal history of other diseases of the respiratory system 01/12/2015    History of sinusitis    Personal history of other diseases of the respiratory system 02/15/2017    History of acute bronchitis    Personal history of other specified conditions 11/01/2016    History of shortness of breath    Unspecified injury of right shoulder and upper arm, initial encounter 09/29/2015    Injury of shoulder, right

## 2025-07-28 ENCOUNTER — LAB (OUTPATIENT)
Dept: LAB | Facility: HOSPITAL | Age: 70
End: 2025-07-28
Payer: COMMERCIAL

## 2025-07-28 DIAGNOSIS — E78.5 DYSLIPIDEMIA: ICD-10-CM

## 2025-07-28 DIAGNOSIS — E78.5 HYPERLIPIDEMIA, UNSPECIFIED HYPERLIPIDEMIA TYPE: ICD-10-CM

## 2025-07-28 DIAGNOSIS — E78.5 HYPERLIPIDEMIA, UNSPECIFIED: Primary | ICD-10-CM

## 2025-07-28 LAB
CHOLEST SERPL-MCNC: 150 MG/DL (ref 0–199)
CHOLESTEROL/HDL RATIO: 2.9
HDLC SERPL-MCNC: 51.7 MG/DL
LDLC SERPL CALC-MCNC: 86 MG/DL
NON HDL CHOLESTEROL: 98 MG/DL (ref 0–149)
TRIGL SERPL-MCNC: 62 MG/DL (ref 0–149)
VLDL: 12 MG/DL (ref 0–40)

## 2025-07-28 PROCEDURE — 80061 LIPID PANEL: CPT

## 2025-08-04 ENCOUNTER — INFUSION (OUTPATIENT)
Dept: HEMATOLOGY/ONCOLOGY | Facility: HOSPITAL | Age: 70
End: 2025-08-04
Payer: MEDICARE

## 2025-08-04 VITALS
SYSTOLIC BLOOD PRESSURE: 119 MMHG | WEIGHT: 201.28 LBS | RESPIRATION RATE: 16 BRPM | BODY MASS INDEX: 30.6 KG/M2 | OXYGEN SATURATION: 96 % | DIASTOLIC BLOOD PRESSURE: 75 MMHG | TEMPERATURE: 96.8 F | HEART RATE: 59 BPM

## 2025-08-04 DIAGNOSIS — E78.5 DYSLIPIDEMIA: Primary | ICD-10-CM

## 2025-08-04 DIAGNOSIS — E78.5 DYSLIPIDEMIA: ICD-10-CM

## 2025-08-04 DIAGNOSIS — E78.5 HYPERLIPIDEMIA, UNSPECIFIED HYPERLIPIDEMIA TYPE: ICD-10-CM

## 2025-08-04 PROCEDURE — 2500000004 HC RX 250 GENERAL PHARMACY W/ HCPCS (ALT 636 FOR OP/ED): Mod: JZ,TB | Performed by: NURSE PRACTITIONER

## 2025-08-04 PROCEDURE — 96372 THER/PROPH/DIAG INJ SC/IM: CPT

## 2025-08-04 RX ORDER — FAMOTIDINE 10 MG/ML
20 INJECTION, SOLUTION INTRAVENOUS ONCE AS NEEDED
OUTPATIENT
Start: 2026-01-31

## 2025-08-04 RX ORDER — FAMOTIDINE 10 MG/ML
20 INJECTION, SOLUTION INTRAVENOUS ONCE AS NEEDED
Status: DISCONTINUED | OUTPATIENT
Start: 2025-08-04 | End: 2025-08-04 | Stop reason: HOSPADM

## 2025-08-04 RX ORDER — DIPHENHYDRAMINE HYDROCHLORIDE 50 MG/ML
50 INJECTION, SOLUTION INTRAMUSCULAR; INTRAVENOUS AS NEEDED
Status: CANCELLED | OUTPATIENT
Start: 2026-01-24

## 2025-08-04 RX ORDER — ALBUTEROL SULFATE 0.83 MG/ML
3 SOLUTION RESPIRATORY (INHALATION) AS NEEDED
Status: CANCELLED | OUTPATIENT
Start: 2026-01-24

## 2025-08-04 RX ORDER — DIPHENHYDRAMINE HYDROCHLORIDE 50 MG/ML
50 INJECTION, SOLUTION INTRAMUSCULAR; INTRAVENOUS AS NEEDED
Status: DISCONTINUED | OUTPATIENT
Start: 2025-08-04 | End: 2025-08-04 | Stop reason: HOSPADM

## 2025-08-04 RX ORDER — ALBUTEROL SULFATE 0.83 MG/ML
3 SOLUTION RESPIRATORY (INHALATION) AS NEEDED
Status: CANCELLED | OUTPATIENT
Start: 2025-08-04

## 2025-08-04 RX ORDER — ALBUTEROL SULFATE 0.83 MG/ML
3 SOLUTION RESPIRATORY (INHALATION) AS NEEDED
OUTPATIENT
Start: 2026-01-31

## 2025-08-04 RX ORDER — EPINEPHRINE 0.3 MG/.3ML
0.3 INJECTION SUBCUTANEOUS EVERY 5 MIN PRN
OUTPATIENT
Start: 2026-01-31

## 2025-08-04 RX ORDER — FAMOTIDINE 10 MG/ML
20 INJECTION, SOLUTION INTRAVENOUS ONCE AS NEEDED
Status: CANCELLED | OUTPATIENT
Start: 2026-01-24

## 2025-08-04 RX ORDER — DIPHENHYDRAMINE HYDROCHLORIDE 50 MG/ML
50 INJECTION, SOLUTION INTRAMUSCULAR; INTRAVENOUS AS NEEDED
Status: CANCELLED | OUTPATIENT
Start: 2025-08-04

## 2025-08-04 RX ORDER — DIPHENHYDRAMINE HYDROCHLORIDE 50 MG/ML
50 INJECTION, SOLUTION INTRAMUSCULAR; INTRAVENOUS AS NEEDED
OUTPATIENT
Start: 2026-01-31

## 2025-08-04 RX ORDER — ALBUTEROL SULFATE 0.83 MG/ML
3 SOLUTION RESPIRATORY (INHALATION) AS NEEDED
Status: DISCONTINUED | OUTPATIENT
Start: 2025-08-04 | End: 2025-08-04 | Stop reason: HOSPADM

## 2025-08-04 RX ORDER — FAMOTIDINE 10 MG/ML
20 INJECTION, SOLUTION INTRAVENOUS ONCE AS NEEDED
Status: CANCELLED | OUTPATIENT
Start: 2025-08-04

## 2025-08-04 RX ORDER — EPINEPHRINE 0.3 MG/.3ML
0.3 INJECTION SUBCUTANEOUS EVERY 5 MIN PRN
Status: DISCONTINUED | OUTPATIENT
Start: 2025-08-04 | End: 2025-08-04 | Stop reason: HOSPADM

## 2025-08-04 RX ORDER — EPINEPHRINE 0.3 MG/.3ML
0.3 INJECTION SUBCUTANEOUS EVERY 5 MIN PRN
Status: CANCELLED | OUTPATIENT
Start: 2026-01-24

## 2025-08-04 RX ORDER — EPINEPHRINE 0.3 MG/.3ML
0.3 INJECTION SUBCUTANEOUS EVERY 5 MIN PRN
Status: CANCELLED | OUTPATIENT
Start: 2025-08-04

## 2025-08-04 RX ADMIN — INCLISIRAN 284 MG: 284 INJECTION, SOLUTION SUBCUTANEOUS at 09:39

## 2025-08-04 ASSESSMENT — PAIN SCALES - GENERAL: PAINLEVEL_OUTOF10: 0-NO PAIN

## 2025-08-15 DIAGNOSIS — R00.2 PALPITATIONS: ICD-10-CM

## 2025-08-15 DIAGNOSIS — I47.10 NONSUSTAINED PAROXYSMAL SUPRAVENTRICULAR TACHYCARDIA: ICD-10-CM

## 2025-08-17 DIAGNOSIS — N32.81 OAB (OVERACTIVE BLADDER): ICD-10-CM

## 2025-08-18 RX ORDER — METOPROLOL SUCCINATE 50 MG/1
TABLET, EXTENDED RELEASE ORAL
Qty: 90 TABLET | Refills: 0 | Status: SHIPPED | OUTPATIENT
Start: 2025-08-18

## 2025-08-18 RX ORDER — FESOTERODINE FUMARATE 8 MG/1
1 TABLET, FILM COATED, EXTENDED RELEASE ORAL DAILY
Qty: 90 TABLET | Refills: 0 | Status: SHIPPED | OUTPATIENT
Start: 2025-08-18

## 2025-10-09 ENCOUNTER — APPOINTMENT (OUTPATIENT)
Dept: PRIMARY CARE | Facility: CLINIC | Age: 70
End: 2025-10-09
Payer: MEDICARE

## 2026-07-20 ENCOUNTER — APPOINTMENT (OUTPATIENT)
Dept: CARDIOLOGY | Facility: CLINIC | Age: 71
End: 2026-07-20
Payer: MEDICARE

## (undated) DEVICE — COVER, TABLE, 44X90

## (undated) DEVICE — IRRIGATION SYSTEM, WOUND, SURGIPHOR, 450ML, STERILE

## (undated) DEVICE — SEALER, BIPOLAR, AQUA MANTYS 6.0

## (undated) DEVICE — HOOD, SURGICAL, FLYTE HYBRID

## (undated) DEVICE — STRIP, SKIN CLOSURE, STERI STRIP, REINFORCED, 0.5 X 4 IN

## (undated) DEVICE — BOWL, MIXING, W/SPATULA, DISPOSABLE

## (undated) DEVICE — BANDAGE, ELASTIC,  6 IN X 11 YDS, STERILE, LF

## (undated) DEVICE — SUTURE, CTD, VICRYL, 2-0, UND, BR, CT-2

## (undated) DEVICE — WIPE, FILM BARRIER, CAVILON, 2 IN 1, NO STING, 1ML

## (undated) DEVICE — SYRINGE, 60 CC, LUER LOCK, MONOJECT

## (undated) DEVICE — ADHESIVE, SKIN, LIQUIBAND EXCEED

## (undated) DEVICE — SUTURE, VICRYL, 1, 36 IN, CT-1, UNDYED

## (undated) DEVICE — CATHETER TRAY, SURESTEP, 14FR, PRECONNECTED DRAIN BAG

## (undated) DEVICE — NEEDLE, SPINAL, 20 G X 2.5 IN, YELLOW HUB

## (undated) DEVICE — CUFF, TOURNIQUET, 34 X 4, SNGL PORT/SNGL BLADDER, DISP, LF

## (undated) DEVICE — TIP, SUCTION, YANKAUER, FLEXIBLE

## (undated) DEVICE — DRAPE PACK, TOTAL KNEE, CUSTOM, GEAUGA

## (undated) DEVICE — TRAY, MINOR, SINGLE BASIN, STERILE

## (undated) DEVICE — KIT, MINOR, DOUBLE BASIN